# Patient Record
Sex: FEMALE | Race: BLACK OR AFRICAN AMERICAN
[De-identification: names, ages, dates, MRNs, and addresses within clinical notes are randomized per-mention and may not be internally consistent; named-entity substitution may affect disease eponyms.]

---

## 2017-03-20 ENCOUNTER — HOSPITAL ENCOUNTER (EMERGENCY)
Dept: HOSPITAL 62 - ER | Age: 37
Discharge: HOME | End: 2017-03-20
Payer: SELF-PAY

## 2017-03-20 VITALS — DIASTOLIC BLOOD PRESSURE: 104 MMHG | SYSTOLIC BLOOD PRESSURE: 162 MMHG

## 2017-03-20 DIAGNOSIS — I10: ICD-10-CM

## 2017-03-20 DIAGNOSIS — F17.200: ICD-10-CM

## 2017-03-20 DIAGNOSIS — R11.2: Primary | ICD-10-CM

## 2017-03-20 DIAGNOSIS — R19.7: ICD-10-CM

## 2017-03-20 LAB
ALBUMIN SERPL-MCNC: 4.6 G/DL (ref 3.5–5)
ALP SERPL-CCNC: 98 U/L (ref 38–126)
ALT SERPL-CCNC: 18 U/L (ref 9–52)
ANION GAP SERPL CALC-SCNC: 14 MMOL/L (ref 5–19)
APPEARANCE UR: (no result)
AST SERPL-CCNC: 21 U/L (ref 14–36)
BASOPHILS # BLD AUTO: 0 10^3/UL (ref 0–0.2)
BASOPHILS NFR BLD AUTO: 0.3 % (ref 0–2)
BILIRUB DIRECT SERPL-MCNC: 0.3 MG/DL (ref 0–0.4)
BILIRUB SERPL-MCNC: 0.9 MG/DL (ref 0.2–1.3)
BILIRUB UR QL STRIP: NEGATIVE
BUN SERPL-MCNC: 9 MG/DL (ref 7–20)
CALCIUM: 10.4 MG/DL (ref 8.4–10.2)
CHLORIDE SERPL-SCNC: 106 MMOL/L (ref 98–107)
CO2 SERPL-SCNC: 24 MMOL/L (ref 22–30)
CREAT SERPL-MCNC: 0.69 MG/DL (ref 0.52–1.25)
EOSINOPHIL # BLD AUTO: 0.1 10^3/UL (ref 0–0.6)
EOSINOPHIL NFR BLD AUTO: 1.1 % (ref 0–6)
ERYTHROCYTE [DISTWIDTH] IN BLOOD BY AUTOMATED COUNT: 14.2 % (ref 11.5–14)
GLUCOSE SERPL-MCNC: 149 MG/DL (ref 75–110)
GLUCOSE UR STRIP-MCNC: NEGATIVE MG/DL
HCT VFR BLD CALC: 38.3 % (ref 36–47)
HGB BLD-MCNC: 13.1 G/DL (ref 12–15.5)
HGB HCT DIFFERENCE: 1
KETONES UR STRIP-MCNC: NEGATIVE MG/DL
LYMPHOCYTES # BLD AUTO: 2 10^3/UL (ref 0.5–4.7)
LYMPHOCYTES NFR BLD AUTO: 26.1 % (ref 13–45)
MCH RBC QN AUTO: 29.7 PG (ref 27–33.4)
MCHC RBC AUTO-ENTMCNC: 34.3 G/DL (ref 32–36)
MCV RBC AUTO: 87 FL (ref 80–97)
MONOCYTES # BLD AUTO: 0.4 10^3/UL (ref 0.1–1.4)
MONOCYTES NFR BLD AUTO: 5.6 % (ref 3–13)
NEUTROPHILS # BLD AUTO: 5.2 10^3/UL (ref 1.7–8.2)
NEUTS SEG NFR BLD AUTO: 66.9 % (ref 42–78)
NITRITE UR QL STRIP: NEGATIVE
PH UR STRIP: 5 [PH] (ref 5–9)
POTASSIUM SERPL-SCNC: 3.6 MMOL/L (ref 3.6–5)
PROT SERPL-MCNC: 8 G/DL (ref 6.3–8.2)
PROT UR STRIP-MCNC: NEGATIVE MG/DL
RBC # BLD AUTO: 4.42 10^6/UL (ref 3.72–5.28)
SODIUM SERPL-SCNC: 143.9 MMOL/L (ref 137–145)
SP GR UR STRIP: 1.02
UROBILINOGEN UR-MCNC: NEGATIVE MG/DL (ref ?–2)
WBC # BLD AUTO: 7.8 10^3/UL (ref 4–10.5)

## 2017-03-20 PROCEDURE — 81001 URINALYSIS AUTO W/SCOPE: CPT

## 2017-03-20 PROCEDURE — 84703 CHORIONIC GONADOTROPIN ASSAY: CPT

## 2017-03-20 PROCEDURE — 99284 EMERGENCY DEPT VISIT MOD MDM: CPT

## 2017-03-20 PROCEDURE — 36415 COLL VENOUS BLD VENIPUNCTURE: CPT

## 2017-03-20 PROCEDURE — 85025 COMPLETE CBC W/AUTO DIFF WBC: CPT

## 2017-03-20 PROCEDURE — 80053 COMPREHEN METABOLIC PANEL: CPT

## 2017-03-20 NOTE — ER DOCUMENT REPORT
ED Medical Screen (RME)





- General


Stated Complaint: DIARRHEA,VOMITING


Time seen by provider: 14:43


Mode of Arrival: Ambulatory


Information source: Patient


Notes: 


36-year-old female presents to ED for diarrhea and vomiting with chills she 

denies fevers.  Take this been going on for 2 days.  She states that she has 

lower abdominal pain bilaterally for 2 days.  She works at the drive-through at 

[employer] so she doesn't know who she was exposed to.  Last menstrual period 

was 01/01/2017.  She states she is normally regular but she's taken several 

pregnancy tests that were negative























I have greeted and performed a rapid initial assessment of this patient.  A 

comprehensive ED assessment and evaluation of the patient, analysis of test 

results and completion of medical decision making process will be conducted by 

an additional ED providers.


TRAVEL OUTSIDE OF THE [place] IN LAST 30 DAYS: No





- Related Data


Allergies/Adverse Reactions: 


 





No Known Allergies Allergy (Verified 06/15/14 06:59)


 











Past Medical History





- Past Medical History


Cardiac Medical History: Reports: Hx Hypertension





- Immunizations


Hx Diphtheria, Pertussis, Tetanus Vaccination: Yes





Physical Exam





- Vital signs


Vitals: 





 











Temp Pulse Resp BP Pulse Ox


 


 98.2 F   91   16   141/91 H  98 


 


 03/20/17 14:41  03/20/17 14:41  03/20/17 14:41  03/20/17 14:41  03/20/17 14:41














Course





- Vital Signs


Vital signs: 





 











Temp Pulse Resp BP Pulse Ox


 


 98.2 F   91   16   141/91 H  98 


 


 03/20/17 14:41  03/20/17 14:41  03/20/17 14:41  03/20/17 14:41  03/20/17 14:41

## 2017-03-20 NOTE — ER DOCUMENT REPORT
ED GI/





- General


Chief Complaint: Nausea/Vomiting/Diarrhea


Stated Complaint: DIARRHEA,VOMITING


Mode of Arrival: Ambulatory


Notes: 


Patient is a 36-year-old female that comes emergency department with chief 

complaint of nausea, vomiting, and a couple of loose stools.  Patient states 

she has vomited 4 times today, she started feeling ill yesterday.  Reports 

multiple sick contacts at work, states she thinks she got this and she needs a 

work.  She denies any abdominal pain, flank pain, vaginal bleeding or 

discharge.  She is unsure fever.  She denies any recent antibiotics.  She 

states she has missed a menstrual period for about 6 weeks, takes normal 

contraceptive.  She has not had a menstrual period since she started a new job.

  Denies any abdominal surgeries or daily medications.


TRAVEL OUTSIDE OF THE [place] IN LAST 30 DAYS: No





- Related Data


Allergies/Adverse Reactions: 


 





No Known Allergies Allergy (Verified 03/20/17 14:45)


 











Past Medical History





- General


Information source: Patient





- Social History


Smoking Status: Current Every Day Smoker


Chew tobacco use (# tins/day): No


Frequency of alcohol use: Occasional


Drug Abuse: None


Lives with: Family


Family History: Reviewed & Not Pertinent


Patient has suicidal ideation: No


Patient has homicidal ideation: No





- Past Medical History


Cardiac Medical History: Reports: Hx Hypertension


Renal/ Medical History: Denies: Hx Peritoneal Dialysis


Surgical Hx: Negative





- Immunizations


Hx Diphtheria, Pertussis, Tetanus Vaccination: Yes





Review of Systems





- Review of Systems


Constitutional: No symptoms reported


EENT: No symptoms reported


Cardiovascular: No symptoms reported


Respiratory: No symptoms reported


Gastrointestinal: See HPI


Genitourinary: No symptoms reported


Female Genitourinary: No symptoms reported


Musculoskeletal: No symptoms reported


Skin: No symptoms reported


Hematologic/Lymphatic: No symptoms reported


Neurological/Psychological: No symptoms reported





Physical Exam





- Vital signs


Vitals: 


 











Temp Pulse Resp BP Pulse Ox


 


 98.2 F   91   16   141/91 H  98 


 


 03/20/17 14:41  03/20/17 14:41  03/20/17 14:41  03/20/17 14:41  03/20/17 14:41











Interpretation: Normal





- General


General appearance: Appears well, Alert


In distress: None - Smiling and well-appearing





- HEENT


Head: Normocephalic, Atraumatic


Eyes: Normal


Pupils: PERRL





- Respiratory


Respiratory status: No respiratory distress


Chest status: Nontender


Breath sounds: Normal.  No: Decreased air movement, Wheezing


Chest palpation: Normal





- Cardiovascular


Rhythm: Regular.  No: Tachycardia


Heart sounds: Normal auscultation, S1 appreciated, S2 appreciated


Murmur: No





- Abdominal


Inspection: Normal


Distension: No distension


Bowel sounds: Normal


Tenderness: Nontender.  No: Tender - Soft and benign


Organomegaly: No organomegaly





- Back


Back: Normal, Nontender





- Extremities


General upper extremity: Normal inspection, Nontender, Normal color, Normal ROM

, Normal temperature


General lower extremity: Normal inspection, Nontender, Normal color, Normal ROM

, Normal temperature, Normal weight bearing.  No: Dustin's sign





- Neurological


Neuro grossly intact: Yes


Cognition: Normal


Orientation: AAOx4


[place] Coma Scale Eye Opening: Spontaneous


[place] Coma Scale Verbal: Oriented


Ivy Coma Scale Motor: Obeys Commands


[place]vy Coma Scale Total: 15


Speech: Normal


Motor strength normal: LUE, RUE, LLE, RLE


Sensory: Normal





- Psychological


Associated symptoms: Normal affect, Normal mood





- Skin


Skin Temperature: Warm


Skin Moisture: Dry


Skin Color: Normal





Course





- Re-evaluation


Re-evalutation: 


Laboratory workup generally unremarkable with no leukocytosis, generally 

unremarkable urine and chemistry.  No tachycardia, fever, hypotension.  Soft 

abdomen on my exam.  Patient smiling and well-appearing.  Patient states she 

feels great after the Phenergan.  Will prescribe this at home.  Patient is 

slightly hypertensive, she states that normally at home hers is normal, states 

she will follow this with primary care.  She denies any headache or chest pain.

  She denies any current symptoms.  Suspect patient has a viral syndrome, 

discussed primary care follow-up and return precautions.  Patient states 

understanding and agreement.





- Vital Signs


Vital signs: 


 











Temp Pulse Resp BP Pulse Ox


 


 98.3 F   87   16   162/104 H  100 


 


 03/20/17 21:47  03/20/17 21:47  03/20/17 21:47  03/20/17 21:47  03/20/17 21:47














- Laboratory


Result Diagrams: 


 03/20/17 15:02





 03/20/17 15:20


Laboratory results interpreted by me: 


 











  03/20/17 03/20/17





  15:02 15:20


 


RDW  14.2 H 


 


Glucose   149 H


 


Calcium   10.4 H














Discharge





- Discharge


Clinical Impression: 


 Nausea vomiting and diarrhea





Condition: Stable


Disposition: HOME, SELF-CARE


Additional Instructions: 


Pregnancy test is negative.


Workup, examination, and symptoms are most consistent with a viral syndrome, 

this should resolve on its own.


I recommend Tylenol for fever/chills.


Rest, hydrate, take the Phenergan if needed for nausea, start with bland foods 

to begin with.


Return the emergency department for any concerning worsening symptoms including 

uncontrollable vomiting, severe abdominal pain, etc.


Prescriptions: 


Promethazine HCl [Phenergan 25 mg Tablet] 1 - 2 tab PO Q6H PRN #20 tablet


 PRN Reason: 


Forms:  Return to Work, Elevated Blood Pressure

## 2017-05-09 ENCOUNTER — HOSPITAL ENCOUNTER (EMERGENCY)
Dept: HOSPITAL 62 - ER | Age: 37
Discharge: HOME | End: 2017-05-09
Payer: SELF-PAY

## 2017-05-09 VITALS — DIASTOLIC BLOOD PRESSURE: 86 MMHG | SYSTOLIC BLOOD PRESSURE: 137 MMHG

## 2017-05-09 DIAGNOSIS — R05: ICD-10-CM

## 2017-05-09 DIAGNOSIS — J00: Primary | ICD-10-CM

## 2017-05-09 DIAGNOSIS — R51: ICD-10-CM

## 2017-05-09 DIAGNOSIS — J34.89: ICD-10-CM

## 2017-05-09 DIAGNOSIS — I10: ICD-10-CM

## 2017-05-09 PROCEDURE — 71020: CPT

## 2017-05-09 PROCEDURE — 99283 EMERGENCY DEPT VISIT LOW MDM: CPT

## 2017-05-09 PROCEDURE — 87880 STREP A ASSAY W/OPTIC: CPT

## 2017-05-09 PROCEDURE — 87070 CULTURE OTHR SPECIMN AEROBIC: CPT

## 2017-05-09 PROCEDURE — 87804 INFLUENZA ASSAY W/OPTIC: CPT

## 2017-05-09 NOTE — ER DOCUMENT REPORT
ED Respiratory Problem





- General


Chief Complaint: Cold Symptoms


Stated Complaint: HEADACHE,FEVER


Time Seen by Provider: 05/09/17 08:16


Mode of Arrival: Ambulatory


Information source: Patient


Notes: 


Patient is a 36-year-old American female who presents to the ER today for cough

, congestion, runny nose, sore throat and pain in her chest with breathing that 

all began yesterday.  She admits to fever, chills and body aches but has not 

taken her temperature.  She tried some [place] over-the-counter cold 

medication that did not help.  She denies any history of asthma.


TRAVEL OUTSIDE OF THE [place] IN LAST 30 DAYS: No





- Related Data


Allergies/Adverse Reactions: 


 





No Known Allergies Allergy (Verified 05/09/17 08:00)


 











Past Medical History





- General


Information source: Patient





- Social History


Smoking Status: Unknown if Ever Smoked


Family History: Reviewed & Not Pertinent


Patient has suicidal ideation: No


Patient has homicidal ideation: No





- Past Medical History


Cardiac Medical History: Reports: Hx Hypertension


Renal/ Medical History: Denies: Hx Peritoneal Dialysis





- Immunizations


Hx Diphtheria, Pertussis, Tetanus Vaccination: Yes





Review of Systems





- Review of Systems


Constitutional: See HPI


EENT: See HPI


Cardiovascular: No symptoms reported


Respiratory: See HPI


Gastrointestinal: No symptoms reported


Genitourinary: No symptoms reported


Female Genitourinary: No symptoms reported


Musculoskeletal: No symptoms reported


Skin: No symptoms reported


Hematologic/Lymphatic: No symptoms reported


Neurological/Psychological: No symptoms reported





Physical Exam





- Vital signs


Vitals: 


 











Temp Pulse Resp BP Pulse Ox


 


 99.2 F   105 H  20   127/88 H  96 


 


 05/09/17 08:00  05/09/17 08:00  05/09/17 08:00  05/09/17 08:00  05/09/17 08:00














- Notes


Notes: 


PHYSICAL EXAMINATION: 


GENERAL: Mildly ill-appearing, but in no acute distress. 


HEAD: Atraumatic, normocephalic. 


EYES: Pupils equal round and reactive to light, extraocular movements intact, 

sclera anicteric, conjunctiva are normal. 


ENT: ear canals without erythema or foreign body, TMs pearly grey with good 

bony landmarks, nares with mucoid discharge, oropharynx erythematous with 

enlarged tonsils without exudates. Moist mucous membranes. 


NECK: Normal range of motion, supple without lymphadenopathy 


LUNGS: Chest nontender to palpation, Coughing, otherwise CTAB and equal. No 

wheezes rales or rhonchi. 


HEART: Regular rate and rhythm without murmurs


EXTREMITIES: Normal range of motion, no pitting edema. No cyanosis. 


NEUROLOGICAL: Cranial nerves grossly intact. Normal sensory/motor exams. 


PSYCH: Normal mood, normal affect. 


SKIN: Warm, Dry, normal turgor, no rashes or lesions noted 

















Course





- Re-evaluation


Re-evalutation: 





05/09/17 09:08


Flu and strep are negative.  Chest x-ray reveals no acute abnormality.  Patient 

will be placed on conservative, symptomatic treatment.





- Vital Signs


Vital signs: 


 











Temp Pulse Resp BP Pulse Ox


 


 99.2 F   105 H  20   127/88 H  96 


 


 05/09/17 08:00  05/09/17 08:00  05/09/17 08:00  05/09/17 08:00  05/09/17 08:00














Discharge





- Discharge


Clinical Impression: 


Upper respiratory infection


Qualifiers:


 URI type: acute nasopharyngitis (common cold) Qualified Code(s): J00 - Acute 

nasopharyngitis [common cold]





Instructions:  Upper Respiratory Illness ([facility]), Fever ([facility])


Additional Instructions: 


Return immediately for any new or worsening symptoms.





Follow up with primary care provider, call tomorrow to make followup 

appointment.


Prescriptions: 


Hydrocodone Bit/Homatropine [Hycodan Syrup 5-1.5 mg/5 ml Ud Cup] 5 ml PO Q4HP 

PRN #120 ml


 PRN Reason: 


Fluticasone Propionate [Flonase Allergy Relief] 15.8 ml NS BID #1 spray.susp


Ibuprofen [Motrin 800 mg Tablet] 800 mg PO Q8H PRN #30 tab


 PRN Reason: 


Forms:  Return to Work

## 2017-08-07 ENCOUNTER — HOSPITAL ENCOUNTER (EMERGENCY)
Dept: HOSPITAL 62 - ER | Age: 37
Discharge: HOME | End: 2017-08-07
Payer: SELF-PAY

## 2017-08-07 VITALS — DIASTOLIC BLOOD PRESSURE: 93 MMHG | SYSTOLIC BLOOD PRESSURE: 163 MMHG

## 2017-08-07 DIAGNOSIS — R68.84: ICD-10-CM

## 2017-08-07 DIAGNOSIS — F17.200: ICD-10-CM

## 2017-08-07 DIAGNOSIS — R22.0: ICD-10-CM

## 2017-08-07 DIAGNOSIS — K08.89: Primary | ICD-10-CM

## 2017-08-07 PROCEDURE — 99283 EMERGENCY DEPT VISIT LOW MDM: CPT

## 2017-08-07 PROCEDURE — S0119 ONDANSETRON 4 MG: HCPCS

## 2017-08-07 NOTE — ER DOCUMENT REPORT
ED ENT





- General


Chief Complaint: Facial Swelling


Stated Complaint: FACIAL SWELLING/POSSIBLE INFECTED TOOTH


Time Seen by Provider: 08/07/17 07:12


Notes: 


Patient is a 36-year-old female who presents emergency department complaining 

of right upper jaw pain with associated facial swelling.  Patient states that 

she fractured her tooth a couple months ago without any problems with.  Patient 

states that it started bothering her yesterday at work and she is taking over-

the-counter Tylenol with moderate improvement in her symptoms.  She states that 

her last dose was about 11 PM last evening.  Patient states that she woke up 

this morning with swelling on her face and tenderness of her cheek.  Otherwise 

denies any fever, chills, difficulty swallowing, difficulty breathing.  Patient 

otherwise healthy


TRAVEL OUTSIDE OF THE [place] IN LAST 30 DAYS: No





- Related Data


Allergies/Adverse Reactions: 


 





No Known Allergies Allergy (Verified 08/07/17 06:54)


 











Past Medical History





- Social History


Smoking Status: Current Every Day Smoker


Chew tobacco use (# tins/day): No


Frequency of alcohol use: Occasional


Drug Abuse: None


Family History: Reviewed & Not Pertinent





- Past Medical History


Cardiac Medical History: Reports: Hx Hypertension


Renal/ Medical History: Denies: Hx Peritoneal Dialysis


Surgical Hx: Negative





- Immunizations


Hx Diphtheria, Pertussis, Tetanus Vaccination: Yes





Review of Systems





- Review of Systems


Constitutional: No symptoms reported


EENT: See HPI


-: Yes All other systems reviewed and negative





Physical Exam





- Vital signs


Vitals: 


 











Temp Pulse Resp BP Pulse Ox


 


 98.5 F   88   20   164/99 H  98 


 


 08/07/17 06:42  08/07/17 06:42  08/07/17 06:42  08/07/17 06:42  08/07/17 06:42














- HEENT


Head: Normocephalic, Atraumatic


Eyes: Normal.  No: Periorbital edema


Conjunctiva: Normal


Extraocular movements intact: Yes


Eyelashes: Normal


Pupils: PERRL


Mouth/Lips: Dental fracture - on 2.  No: Laceration


Mucous membranes: Normal


Pharynx: Normal.  No: Blood in hypopharynx, Peritonsillar abscess, 

Retropharyngeal abscess, Potential airway comprom.


Neck: Normal.  No: Lymphadenopathy





- Respiratory


Respiratory status: No respiratory distress


Chest status: Nontender


Breath sounds: Normal


Chest palpation: Normal





- Cardiovascular


Rhythm: Regular


Heart sounds: Normal auscultation, S1 appreciated, S2 appreciated


Murmur: No


Gallop: None auscultated





- Neurological


Neuro grossly intact: Yes


Cognition: Normal


Orientation: AAOx4


Ivy Coma Scale Eye Opening: Spontaneous


[place] Coma Scale Verbal: Oriented


[place] Coma Scale Motor: Obeys Commands


[place] Coma Scale Total: 15


Speech: Normal


Cranial nerves: Normal


Cerebellar coordination: Normal


Motor strength normal: LUE, RUE, LLE, RLE


Additional motor exam normals: Equal , Weakness


Sensory: Normal





Course





- Re-evaluation


Re-evalutation: 


08/07/17 08:31


Patient is a 36-year-old female who is hemodynamically stable, no acute 

distress and afebrile.  Presentation is most consistent with likely an infected 

tooth.  Airway is patent.  Vitals within normal limits.  Patient is able 

swallow without any difficulty.  There is no significant facial swelling.  

Patient will be started on antibiotics and a limited number of pain 

medications.  I've instructed to follow-up with dentistry as earliest ability 

for definitive management.  Return precautions and follow-up recommendations 

have been discussed at length.











- Vital Signs


Vital signs: 


 











Temp Pulse Resp BP Pulse Ox


 


 98.5 F   88   20   164/99 H  98 


 


 08/07/17 06:42  08/07/17 06:42  08/07/17 06:42  08/07/17 06:42  08/07/17 06:42














Discharge





- Discharge


Clinical Impression: 


 Toothache





Condition: Good


Disposition: HOME, SELF-CARE


Additional Instructions: 


TOOTHACHE:





     Your pain is due to dental decay.  The tooth must be repaired in order for 

you to feel better.  You will, therefore, be referred to a dentist.  We do not 

have dentists on the staff at [facility].


     Severe swelling or drainage around a tooth usually means a dental abscess.

  This also requires evaluation and treatment by the dentist, but antibiotics 

may be prescribed while awaiting dental treatment.


     You should be rechecked immediately if you develop major swelling of the 

face, increasing pain, a lump in the jaw or gums, headache, difficulty 

swallowing, or fever.








ORAL NARCOTIC MEDICATION:


     You have been given a prescription for pain control.  This medication is a 

narcotic.  It's best taken with food, as nausea can result if taken on an empty 

stomach.


     Don't operate machinery or drive within six hours of taking this 

medication.  Do not combine this medicine with alcohol, or with any medication 

which can cause sedation (such as cold tablets or sleeping pills) unless you 

get permission from the physician.


     Narcotics tend to cause constipation.  If possible, drink plenty of fluids 

and eat a diet high in fiber and fruits.





     Please be aware that prescription narcotics also have the potential for 

abuse.  People become addicted to these medications because of the general 

sense of wellbeing that they induce.  This feeling along with a significant 

reduction in tension, anxiety, and aggression provides a stimulating seductive 

quality to these drugs.  Once your pain is under control, we encourage you to 

discard your unused narcotics.








PENICILLIN V K:


     You have been given a prescription for Penicillin VK.  Your physician has 

determined that this is the best antibiotic for your condition.


     Pen VK can be taken with meals, however more of the antibiotic gets into 

the bloodstream if it's taken on an empty stomach.


     Penicillin usually has no side effects.  However, allergy to penicillins 

is common.  If you have had an allergic reaction to any drug of the penicillin 

family, you should never take any other penicillin.  Notify your doctor at once 

if you develop hives, itching, swelling, faintness, or shortness of breath.








FOLLOW-UP CARE:


     You have been referred for follow-up care to the dentists listed below.  

Call the dentists office for an appointment as you were instructed or within 

the next two days.  If you experience worsening or a significant change in your 

symptoms, notify the physician immediately or return to the Emergency 

Department at any time for re-evaluation.





[facility]


[address] [place], [place]


[phone]


Friday mornings, by appointment





[facility]


[address] [place] [address] [phone]





[facility]


[address].


[phone]





[facility]


[address]


[facility]


[phone]





[facility]


[address].


[phone]


[email or web address]





[facility]


[address] [place]  [address] [phone]


Monday-Thursdays  8:00am to 5:00 pm


Will see patients from other [facility].


Charges based on income and family size and


accepts Medicare, Medicaid, and Insurances


Will pull molars





[facility]


Student Clinics


[facility] [address] [phone]


Hours of Operation


   8:00 am - 4:30 pm weekdays





The following dental offices accept Medicaid:





   [facility]   [phone]


   Dr. Burnett         [phone]


   Dr. Martinez         [phone]


   Dr. Avelar         [phone]


   Dr. Stahl         [phone]


   [facility]


      oral surgery      [phone]


   Dr. Moran ([place])      [phone]


   Dr. Allison ([place])   [phone]


   [facility]      [phone]


   Monique Uriarte ([place])   [phone]


   Dr. Vogel ([place])      [phone]


   [facility]      [phone]


   [facility]   [phone]


   [facility]   [phone]


   Dr. Acharya ([place])      [phone]


   Monique Rubin and 


      ([place])      [phone]





   Medicaid Care Line      [phone]


Prescriptions: 


Ibuprofen [Motrin 600 Mg Tablet] 600 mg PO TID #15 tablet


Penicillin V Potassium [Penicillin Vk 500 mg Tablet] 500 mg PO BID #20 tablet

## 2017-11-17 ENCOUNTER — HOSPITAL ENCOUNTER (EMERGENCY)
Dept: HOSPITAL 62 - ER | Age: 37
Discharge: HOME | End: 2017-11-17
Payer: SELF-PAY

## 2017-11-17 VITALS — DIASTOLIC BLOOD PRESSURE: 89 MMHG | SYSTOLIC BLOOD PRESSURE: 138 MMHG

## 2017-11-17 DIAGNOSIS — R10.9: ICD-10-CM

## 2017-11-17 DIAGNOSIS — M54.5: ICD-10-CM

## 2017-11-17 DIAGNOSIS — R11.2: ICD-10-CM

## 2017-11-17 DIAGNOSIS — F17.200: ICD-10-CM

## 2017-11-17 DIAGNOSIS — R19.7: ICD-10-CM

## 2017-11-17 DIAGNOSIS — R03.0: ICD-10-CM

## 2017-11-17 DIAGNOSIS — E86.0: Primary | ICD-10-CM

## 2017-11-17 LAB
ALBUMIN SERPL-MCNC: 4.9 G/DL (ref 3.5–5)
ALP SERPL-CCNC: 122 U/L (ref 38–126)
ALT SERPL-CCNC: 26 U/L (ref 9–52)
ANION GAP SERPL CALC-SCNC: 16 MMOL/L (ref 5–19)
APPEARANCE UR: (no result)
AST SERPL-CCNC: 37 U/L (ref 14–36)
BASOPHILS # BLD AUTO: 0 10^3/UL (ref 0–0.2)
BASOPHILS NFR BLD AUTO: 0.3 % (ref 0–2)
BILIRUB DIRECT SERPL-MCNC: 0.6 MG/DL (ref 0–0.4)
BILIRUB SERPL-MCNC: 1.9 MG/DL (ref 0.2–1.3)
BILIRUB UR QL STRIP: NEGATIVE
BUN SERPL-MCNC: 10 MG/DL (ref 7–20)
CALCIUM: 10.1 MG/DL (ref 8.4–10.2)
CHLORIDE SERPL-SCNC: 103 MMOL/L (ref 98–107)
CO2 SERPL-SCNC: 24 MMOL/L (ref 22–30)
CREAT SERPL-MCNC: 0.62 MG/DL (ref 0.52–1.25)
EOSINOPHIL # BLD AUTO: 0.1 10^3/UL (ref 0–0.6)
EOSINOPHIL NFR BLD AUTO: 0.7 % (ref 0–6)
ERYTHROCYTE [DISTWIDTH] IN BLOOD BY AUTOMATED COUNT: 13.7 % (ref 11.5–14)
GLUCOSE SERPL-MCNC: 115 MG/DL (ref 75–110)
GLUCOSE UR STRIP-MCNC: NEGATIVE MG/DL
HCT VFR BLD CALC: 41 % (ref 36–47)
HGB BLD-MCNC: 14.5 G/DL (ref 12–15.5)
HGB HCT DIFFERENCE: 2.5
KETONES UR STRIP-MCNC: (no result) MG/DL
LIPASE SERPL-CCNC: 169.9 U/L (ref 23–300)
LYMPHOCYTES # BLD AUTO: 1.8 10^3/UL (ref 0.5–4.7)
LYMPHOCYTES NFR BLD AUTO: 23.9 % (ref 13–45)
MCH RBC QN AUTO: 31.8 PG (ref 27–33.4)
MCHC RBC AUTO-ENTMCNC: 35.3 G/DL (ref 32–36)
MCV RBC AUTO: 90 FL (ref 80–97)
MONOCYTES # BLD AUTO: 0.4 10^3/UL (ref 0.1–1.4)
MONOCYTES NFR BLD AUTO: 5.7 % (ref 3–13)
NEUTROPHILS # BLD AUTO: 5.3 10^3/UL (ref 1.7–8.2)
NEUTS SEG NFR BLD AUTO: 69.4 % (ref 42–78)
NITRITE UR QL STRIP: NEGATIVE
PH UR STRIP: 5 [PH] (ref 5–9)
POTASSIUM SERPL-SCNC: 4 MMOL/L (ref 3.6–5)
PROT SERPL-MCNC: 8.4 G/DL (ref 6.3–8.2)
PROT UR STRIP-MCNC: 100 MG/DL
RBC # BLD AUTO: 4.56 10^6/UL (ref 3.72–5.28)
SODIUM SERPL-SCNC: 142.8 MMOL/L (ref 137–145)
SP GR UR STRIP: 1.02
UROBILINOGEN UR-MCNC: NEGATIVE MG/DL (ref ?–2)
WBC # BLD AUTO: 7.6 10^3/UL (ref 4–10.5)

## 2017-11-17 PROCEDURE — 84703 CHORIONIC GONADOTROPIN ASSAY: CPT

## 2017-11-17 PROCEDURE — 84443 ASSAY THYROID STIM HORMONE: CPT

## 2017-11-17 PROCEDURE — 85025 COMPLETE CBC W/AUTO DIFF WBC: CPT

## 2017-11-17 PROCEDURE — 80053 COMPREHEN METABOLIC PANEL: CPT

## 2017-11-17 PROCEDURE — 81001 URINALYSIS AUTO W/SCOPE: CPT

## 2017-11-17 PROCEDURE — 36415 COLL VENOUS BLD VENIPUNCTURE: CPT

## 2017-11-17 PROCEDURE — 96360 HYDRATION IV INFUSION INIT: CPT

## 2017-11-17 PROCEDURE — 83690 ASSAY OF LIPASE: CPT

## 2017-11-17 PROCEDURE — S0119 ONDANSETRON 4 MG: HCPCS

## 2017-11-17 PROCEDURE — 99284 EMERGENCY DEPT VISIT MOD MDM: CPT

## 2017-11-17 PROCEDURE — 87086 URINE CULTURE/COLONY COUNT: CPT

## 2017-11-17 NOTE — ER DOCUMENT REPORT
HPI





- HPI


Pain Level: 5





- REPRODUCTIVE


Reproductive: DENIES: Pregnant:





Past Medical History





- Social History


Family History: Reviewed & Not Pertinent





- Past Medical History


Cardiac Medical History: Reports: Hx Hypertension


Renal/ Medical History: Denies: Hx Peritoneal Dialysis





- Immunizations


Hx Diphtheria, Pertussis, Tetanus Vaccination: Yes





[facility] Provider Document





- INFECTION CONTROL


TRAVEL OUTSIDE OF THE [place] IN LAST 30 DAYS: No





- RESPIRATORY


O2 Sat by Pulse Oximetry: 98





Course





- Vital Signs


Vital signs: 


 











Temp Pulse Resp BP Pulse Ox


 


 98.7 F   111 H  18   143/101 H  98 


 


 11/17/17 08:25  11/17/17 08:25  11/17/17 08:25  11/17/17 08:25  11/17/17 08:25

## 2017-11-17 NOTE — ER DOCUMENT REPORT
ED GI/





- General


Chief Complaint: Nausea/Vomiting


Stated Complaint: VOMITING


Time Seen by Provider: 17 08:47


Mode of Arrival: Ambulatory


Information source: Patient


Notes: 





35 yo normally healthy, smoker, 2 beers daily until monday, non drugs, no abd 

surgery, . Got off work monday,  woke up with abd. cramps, low bcak pain, 

nausea, vomimting, and warery diarrhea (no blood). No recent antibioitics, no 

travel outside [place], works at [employer], No hx crohns, colitis, diverticulitis. No 

menses since Feb. (mom menopause at age 33) Fever Monday night to tuesday. Hx 

small gallstones.


TRAVEL OUTSIDE OF THE [place] IN LAST 30 DAYS: No





- Related Data


Allergies/Adverse Reactions: 


 





No Known Allergies Allergy (Verified 17 08:30)


 











Past Medical History





- General


Information source: Patient





- Social History


Smoking Status: Current Every Day Smoker


Frequency of alcohol use: 2 beers daily until monday


Drug Abuse: None


Occupation: [employer]


Lives with: Spouse/Significant other


Family History: Reviewed & Not Pertinent





- Past Medical History


Cardiac Medical History: Reports: Hx Hypertension


Renal/ Medical History: Denies: Hx Peritoneal Dialysis


Surgical Hx: Negative





- Immunizations


Hx Diphtheria, Pertussis, Tetanus Vaccination: Yes





Review of Systems





- Review of Systems


Constitutional: See HPI


EENT: No symptoms reported


Cardiovascular: No symptoms reported


Respiratory: No symptoms reported


Gastrointestinal: See HPI


Genitourinary: No symptoms reported


Female Genitourinary: No symptoms reported


Musculoskeletal: No symptoms reported


Skin: No symptoms reported


Hematologic/Lymphatic: No symptoms reported


Neurological/Psychological: No symptoms reported





Physical Exam





- Vital signs


Vitals: 


 











Temp Pulse Resp BP Pulse Ox


 


 98.7 F   111 H  18   143/101 H  98 


 


 17 08:25  17 08:25  17 08:25  17 08:25  17 08:25











Interpretation: Normal





- General


General appearance: Appears well, Alert





- HEENT


Head: Normocephalic, Atraumatic


Eyes: Normal


Conjunctiva: Normal


Pupils: PERRL


Neck: Supple.  No: Lymphadenopathy





- Respiratory


Respiratory status: No respiratory distress


Chest status: Nontender


Breath sounds: Normal


Chest palpation: Normal





- Cardiovascular


Rhythm: Regular


Heart sounds: Normal auscultation


Murmur: No





- Abdominal


Inspection: Normal


Distension: No distension


Bowel sounds: Normal


Tenderness: Tender - mild epigastric, LUQ


Organomegaly: No organomegaly





- Back


Back: Normal, Nontender.  No: CVA tenderness





- Extremities


General upper extremity: Normal inspection, Nontender, Normal color, Normal ROM

, Normal temperature


General lower extremity: Normal inspection, Nontender, Normal color, Normal ROM

, Normal temperature, Normal weight bearing.  No: Dustin's sign





- Neurological


Neuro grossly intact: Yes


Cognition: Normal


Orientation: AAOx4


Rudolph Coma Scale Eye Opening: Spontaneous


Ivy Coma Scale Verbal: Oriented


Ivy Coma Scale Motor: Obeys Commands


Ivy Coma Scale Total: 15


Speech: Normal


Motor strength normal: LUE, RUE, LLE, RLE


Sensory: Normal





- Psychological


Associated symptoms: Normal affect, Normal mood





- Skin


Skin Temperature: Warm


Skin Moisture: Dry


Skin Color: Normal


Skin irregularity: negative: Rash





Course





- Re-evaluation


Re-evalutation: 





17 11:01


feels better after 1 liter NS, feels hungary. Needs work note.  Non tender 

abdomen. Labs normal except for bilirubin mild elevation.


17 11:13





17 11:13








- Vital Signs


Vital signs: 


 











Temp Pulse Resp BP Pulse Ox


 


 98.7 F   88   18   138/89 H  98 


 


 17 11:04  17 11:04  17 08:25  17 11:04  17 11:04














- Laboratory


Result Diagrams: 


 17 09:40





 17 09:40


Laboratory results interpreted by me: 


 











  17





  08:40 09:40


 


Glucose   115 H


 


Total Bilirubin   1.9 H


 


Direct Bilirubin   0.6 H


 


AST   37 H


 


Total Protein   8.4 H


 


Urine Protein  100 H 


 


Urine Ketones  TRACE H 


 


Urine Blood  SMALL H 














Discharge





- Discharge


Clinical Impression: 


 elevated blood pressure, Vomiting and diarrhea, Dehydration





Condition: Good


Disposition: HOME, SELF-CARE


Instructions:  Diarrhea, Nonspecific ([facility]), Family Physicians / Practices, 

Nausea or Vomiting, Nonspecific ([facility]), Stop Smoking ([facility])


Additional Instructions: 


continue to hydrate today


call me in am if need to be out of work tomorrow too. [phone]


to er if worse





Forms:  Return to Work

## 2019-08-14 ENCOUNTER — HOSPITAL ENCOUNTER (EMERGENCY)
Dept: HOSPITAL 62 - ER | Age: 39
Discharge: HOME | End: 2019-08-14
Payer: SELF-PAY

## 2019-08-14 VITALS — DIASTOLIC BLOOD PRESSURE: 92 MMHG | SYSTOLIC BLOOD PRESSURE: 151 MMHG

## 2019-08-14 DIAGNOSIS — R11.2: ICD-10-CM

## 2019-08-14 DIAGNOSIS — M79.10: ICD-10-CM

## 2019-08-14 DIAGNOSIS — K80.20: Primary | ICD-10-CM

## 2019-08-14 DIAGNOSIS — F17.210: ICD-10-CM

## 2019-08-14 DIAGNOSIS — Z88.2: ICD-10-CM

## 2019-08-14 DIAGNOSIS — R19.7: ICD-10-CM

## 2019-08-14 LAB
ADD MANUAL DIFF: NO
ALBUMIN SERPL-MCNC: 4.6 G/DL (ref 3.5–5)
ALP SERPL-CCNC: 113 U/L (ref 38–126)
ANION GAP SERPL CALC-SCNC: 10 MMOL/L (ref 5–19)
APPEARANCE UR: (no result)
APTT PPP: YELLOW S
AST SERPL-CCNC: 36 U/L (ref 14–36)
BASOPHILS # BLD AUTO: 0 10^3/UL (ref 0–0.2)
BASOPHILS NFR BLD AUTO: 0.3 % (ref 0–2)
BILIRUB DIRECT SERPL-MCNC: 0.4 MG/DL (ref 0–0.4)
BILIRUB SERPL-MCNC: 1.2 MG/DL (ref 0.2–1.3)
BILIRUB UR QL STRIP: NEGATIVE
BUN SERPL-MCNC: 6 MG/DL (ref 7–20)
CALCIUM: 10.2 MG/DL (ref 8.4–10.2)
CHLORIDE SERPL-SCNC: 108 MMOL/L (ref 98–107)
CO2 SERPL-SCNC: 23 MMOL/L (ref 22–30)
EOSINOPHIL # BLD AUTO: 0.1 10^3/UL (ref 0–0.6)
EOSINOPHIL NFR BLD AUTO: 0.9 % (ref 0–6)
ERYTHROCYTE [DISTWIDTH] IN BLOOD BY AUTOMATED COUNT: 14.7 % (ref 11.5–14)
GLUCOSE SERPL-MCNC: 100 MG/DL (ref 75–110)
GLUCOSE UR STRIP-MCNC: NEGATIVE MG/DL
HCT VFR BLD CALC: 33.3 % (ref 36–47)
HGB BLD-MCNC: 11.5 G/DL (ref 12–15.5)
KETONES UR STRIP-MCNC: (no result) MG/DL
LYMPHOCYTES # BLD AUTO: 1.3 10^3/UL (ref 0.5–4.7)
LYMPHOCYTES NFR BLD AUTO: 13.4 % (ref 13–45)
MCH RBC QN AUTO: 33.6 PG (ref 27–33.4)
MCHC RBC AUTO-ENTMCNC: 34.4 G/DL (ref 32–36)
MCV RBC AUTO: 98 FL (ref 80–97)
MONOCYTES # BLD AUTO: 0.5 10^3/UL (ref 0.1–1.4)
MONOCYTES NFR BLD AUTO: 4.7 % (ref 3–13)
NEUTROPHILS # BLD AUTO: 8.1 10^3/UL (ref 1.7–8.2)
NEUTS SEG NFR BLD AUTO: 80.7 % (ref 42–78)
NITRITE UR QL STRIP: NEGATIVE
PH UR STRIP: 5 [PH] (ref 5–9)
PLATELET # BLD: 343 10^3/UL (ref 150–450)
POTASSIUM SERPL-SCNC: 4.2 MMOL/L (ref 3.6–5)
PROT SERPL-MCNC: 7.9 G/DL (ref 6.3–8.2)
PROT UR STRIP-MCNC: 30 MG/DL
RBC # BLD AUTO: 3.41 10^6/UL (ref 3.72–5.28)
SP GR UR STRIP: 1.01
TOTAL CELLS COUNTED % (AUTO): 100 %
UROBILINOGEN UR-MCNC: NEGATIVE MG/DL (ref ?–2)
WBC # BLD AUTO: 10 10^3/UL (ref 4–10.5)

## 2019-08-14 PROCEDURE — 96375 TX/PRO/DX INJ NEW DRUG ADDON: CPT

## 2019-08-14 PROCEDURE — 99406 BEHAV CHNG SMOKING 3-10 MIN: CPT

## 2019-08-14 PROCEDURE — 81001 URINALYSIS AUTO W/SCOPE: CPT

## 2019-08-14 PROCEDURE — 76705 ECHO EXAM OF ABDOMEN: CPT

## 2019-08-14 PROCEDURE — 83690 ASSAY OF LIPASE: CPT

## 2019-08-14 PROCEDURE — 36415 COLL VENOUS BLD VENIPUNCTURE: CPT

## 2019-08-14 PROCEDURE — 80053 COMPREHEN METABOLIC PANEL: CPT

## 2019-08-14 PROCEDURE — 96361 HYDRATE IV INFUSION ADD-ON: CPT

## 2019-08-14 PROCEDURE — 99283 EMERGENCY DEPT VISIT LOW MDM: CPT

## 2019-08-14 PROCEDURE — 96374 THER/PROPH/DIAG INJ IV PUSH: CPT

## 2019-08-14 PROCEDURE — S0028 INJECTION, FAMOTIDINE, 20 MG: HCPCS

## 2019-08-14 PROCEDURE — 85025 COMPLETE CBC W/AUTO DIFF WBC: CPT

## 2019-08-14 PROCEDURE — 84703 CHORIONIC GONADOTROPIN ASSAY: CPT

## 2019-08-14 NOTE — ER DOCUMENT REPORT
ED General





- General


Chief Complaint: Abdominal Pain


Stated Complaint: SWOLLEN MOUTH


Time Seen by Provider: 08/14/19 09:25


Mode of Arrival: Ambulatory


Information source: Patient, [facility] Records


Notes: 





38-year-old female with hypertension presents to the emergency department with a

chief complaint of nausea, vomiting and diarrhea.  Patient states that yesterday

afternoon she had to leave work around 1:30 PM as she developed vomiting.  

Patient states that over the past 24 hours she has vomited over 10 times.  

Patient denies sick contacts.  Patient states she has had a few episodes of 

diarrhea that she reports is tacky brown in color.  Patient denies dark or 

bloody stools.  Patient denies fever or urinary symptoms.  Patient reports a 

pressure to her lower back.  Patient does complain of generalized abdominal pain

but specifically points to the epigastric and lower abdominal area.  Patient 

states that this feels like a throbbing ache.  Patient states she has not 

tolerated anything by mouth.  Patient states she does have a history of 

hypertension but does not take medications as she does not have a primary care 

physician.  Patient reports that she woke up this morning with numbness on the 

inside of the right lower mouth.  Patient states she did have a tooth pulled in 

the right upper mouth 1 month ago.  She feels like her mouth is slightly 

swollen.  Patient did receive IV fluids, Zofran, Pepcid prior to my exam and 

does report improvement of her epigastric abdominal pain and nausea.


TRAVEL OUTSIDE OF THE [place] IN LAST 30 DAYS: No





- HPI


Onset: Yesterday


Onset/Duration: Gradual, Persistent, Better


Quality of pain: Cramping


Severity: Mild


Associated symptoms: Body/muscle aches, Diarrhea, Earache, Headache, Nausea, 

Vomiting.  denies: Chest pain, Fever, Shortness of breath


Exacerbated by: Denies


Relieved by: Denies


Similar symptoms previously: No


Recently seen / treated by doctor: No





- Related Data


Allergies/Adverse Reactions: 


                                        





Sulfa (Sulfonamide Antibiotics) Adverse Reaction (Verified 08/14/19 10:00)


   











Past Medical History





- General


Information source: Patient, [facility] Records





- Social History


Smoking Status: Current Every Day Smoker


Cigarette use (# per day): Yes - 15


Smoking Education Provided: Yes - Smoking cessation counseling was provided for 

4 minutes at the bedside 


Frequency of alcohol use: Heavy


Drug Abuse: None


Lives with: Family


Family History: Reviewed & Not Pertinent


Patient has suicidal ideation: No


Patient has homicidal ideation: No





- Past Medical History


Cardiac Medical History: Reports: Hx Hypertension


Renal/ Medical History: Denies: Hx Peritoneal Dialysis





- Immunizations


Hx Diphtheria, Pertussis, Tetanus Vaccination: Yes





Review of Systems





- Review of Systems


Notes: 





REVIEW OF SYSTEMS:


CONSTITUTIONAL :  Denies fever,  chills, or sweats.  Denies recent illness. 

Denies weight loss, recent hospitalizations. 


EENT: Denies visual changes, eye pain.  Denies sore throat, oral lesions, 

difficulty swallowing.


CARDIOVASCULAR:  Denies chest pain.  Denies palpitations. Denies lower extremity

edema.


RESPIRATORY:  Denies cough. Denies shortness of breath, wheezing.


GASTROINTESTINAL:  Denies abdominal distention.  + nausea, vomiting,  diarrhea. 

Denies blood in vomitus, stools, or per rectum.  Denies black, tarry stools.  

Denies constipation.  


GENITOURINARY:  Denies difficulty urinating, painful urination,  frequency, 

blood in urine, or  vaginal discharge.


MUSCULOSKELETAL:  Denies back or neck pain or stiffness.  Denies joint pain or 

swelling.


SKIN:   Denies rash, lesions or sores.


HEMATOLOGIC :   Denies easy bruising or bleeding.


LYMPHATIC:  Denies swollen glands.


NEUROLOGICAL:  Denies confusion or altered mental status.  Denies loss of 

consciousness.  Denies dizziness or lightheadedness.  Denies headache.  Denies 

weakness or paralysis.  Denies problems difficulty with ambulation, slurred 

speech.  Denies sensory loss,  or tingling.  Denies seizures.


PSYCHIATRIC:  Denies anxiety or stress.  Denies depression, suicidal ideation, 

or homicidal ideation.  Denies visual or auditory hallucinations.








Physical Exam





- Vital signs


Vitals: 


                                        











Temp Pulse Resp BP Pulse Ox


 


 98.5 F   95   16   151/92 H  97 


 


 08/14/19 08:21  08/14/19 08:21  08/14/19 08:21  08/14/19 08:21  08/14/19 08:21














- Notes


Notes: 





PHYSICAL EXAMINATION:





GENERAL: Well-appearing, well-nourished and in no acute distress.





HEAD: Atraumatic, normocephalic.





EYES: Pupils equal round and reactive to light, extraocular movements intact, 

conjunctiva are normal.





ENT: Nares patent, oropharynx clear without exudates.  Moist mucous membranes.





NECK: Normal range of motion, supple without lymphadenopathy





LUNGS: Breath sounds clear to auscultation bilaterally and equal.  No wheezes 

rales or rhonchi.





HEART: Regular rate and rhythm without murmurs





ABDOMEN: Soft, mild tenderness with palpation to the epigastrium, nondistended 

abdomen.  No guarding, no rebound.  No masses appreciated.





Female : deferred





Musculoskeletal: Normal range of motion, no pitting or edema.  No cyanosis.





NEUROLOGICAL: Mental status; alert and oriented x3.  Cranial nerves II through 

XII intact.  Sensation intact to sharp/dull differentiation in all extremities. 

Motor; normal tone.  No abnormal movements appreciated.  No pronator drift.  

Strength tested and 5/5 in bilateral wrist flexion/extension, elbow 

flexion/extension, shoulder abduction, straight leg raise, knee 

flexion/extension, ankle dorsiflexion/plantar flexion.  Patient ambulates with a

steady gait. Coordination; no ataxia.  Finger to nose and heel to shin testing 

intact bilaterally. Reflexes; brachial radialis, biceps, and patellar reflexes  

within normal limits and symmetric bilaterally.  Babinski with downgoing toes 

bilaterally.





PSYCH: Normal mood, normal affect.





SKIN: Warm, Dry, normal turgor, no rashes or lesions noted.





Course





- Re-evaluation


Re-evalutation: 





08/14/19 10:10





                                        











Temp Pulse Resp BP Pulse Ox


 


 98.5 F   95   16   151/92 H  97 


 


 08/14/19 08:21  08/14/19 08:21  08/14/19 08:21  08/14/19 08:21  08/14/19 08:21














Laboratory











  08/14/19 08/14/19 08/14/19





  09:25 09:25 09:25


 


WBC  10.0  


 


RBC  3.41 L  


 


Hgb  11.5 L  


 


Hct  33.3 L  


 


MCV  98 H  


 


MCH  33.6 H  


 


MCHC  34.4  


 


RDW  14.7 H  


 


Plt Count  343  


 


Seg Neutrophils %  80.7 H  


 


Lymphocytes %  13.4  


 


Monocytes %  4.7  


 


Eosinophils %  0.9  


 


Basophils %  0.3  


 


Absolute Neutrophils  8.1  


 


Absolute Lymphocytes  1.3  


 


Absolute Monocytes  0.5  


 


Absolute Eosinophils  0.1  


 


Absolute Basophils  0.0  


 


Sodium   141.0 


 


Potassium   4.2 


 


Chloride   108 H 


 


Carbon Dioxide   23 


 


Anion Gap   10 


 


BUN   6 L 


 


Creatinine   0.68 


 


Est GFR ( Amer)   > 60 


 


Est GFR (Non-Af Amer)   > 60 


 


Glucose   100 


 


Calcium   10.2 


 


Total Bilirubin   1.2 


 


Direct Bilirubin   0.4 


 


Neonat Total Bilirubin   Not Reportable 


 


Neonat Direct Bilirubin   Not Reportable 


 


Neonat Indirect Bili   Not Reportable 


 


AST   36 


 


ALT   22 


 


Alkaline Phosphatase   113 


 


Total Protein   7.9 


 


Albumin   4.6 


 


Lipase   60.8 


 


Serum HCG, Qual    NEGATIVE


 


Urine Color   


 


Urine Appearance   


 


Urine pH   


 


Ur Specific Gravity   


 


Urine Protein   


 


Urine Glucose (UA)   


 


Urine Ketones   


 


Urine Blood   


 


Urine Nitrite   


 


Urine Bilirubin   


 


Urine Urobilinogen   


 


Ur Leukocyte Esterase   


 


Urine WBC (Auto)   


 


Urine RBC (Auto)   


 


U Hyaline Cast (Auto)   


 


Urine Bacteria (Auto)   


 


Squamous Epi Cells Auto   


 


Amorphous Sediment Auto   


 


Urine Mucus (Auto)   


 


Urine Ascorbic Acid   














  08/14/19





  09:50


 


WBC 


 


RBC 


 


Hgb 


 


Hct 


 


MCV 


 


MCH 


 


MCHC 


 


RDW 


 


Plt Count 


 


Seg Neutrophils % 


 


Lymphocytes % 


 


Monocytes % 


 


Eosinophils % 


 


Basophils % 


 


Absolute Neutrophils 


 


Absolute Lymphocytes 


 


Absolute Monocytes 


 


Absolute Eosinophils 


 


Absolute Basophils 


 


Sodium 


 


Potassium 


 


Chloride 


 


Carbon Dioxide 


 


Anion Gap 


 


BUN 


 


Creatinine 


 


Est GFR ( Amer) 


 


Est GFR (Non-Af Amer) 


 


Glucose 


 


Calcium 


 


Total Bilirubin 


 


Direct Bilirubin 


 


Neonat Total Bilirubin 


 


Neonat Direct Bilirubin 


 


Neonat Indirect Bili 


 


AST 


 


ALT 


 


Alkaline Phosphatase 


 


Total Protein 


 


Albumin 


 


Lipase 


 


Serum HCG, Qual 


 


Urine Color  YELLOW


 


Urine Appearance  CLOUDY


 


Urine pH  5.0


 


Ur Specific Gravity  1.012


 


Urine Protein  30 H


 


Urine Glucose (UA)  NEGATIVE


 


Urine Ketones  TRACE H


 


Urine Blood  SMALL H


 


Urine Nitrite  NEGATIVE


 


Urine Bilirubin  NEGATIVE


 


Urine Urobilinogen  NEGATIVE


 


Ur Leukocyte Esterase  MODERATE H


 


Urine WBC (Auto)  17


 


Urine RBC (Auto)  3


 


U Hyaline Cast (Auto)  8


 


Urine Bacteria (Auto)  TRACE


 


Squamous Epi Cells Auto  16


 


Amorphous Sediment Auto  TRACE


 


Urine Mucus (Auto)  OCC


 


Urine Ascorbic Acid  NEGATIVE














                                        





Abdomen Ultrasound  08/14/19 10:05


IMPRESSION:  Cholelithiasis without secondary evidence of acute cholecystitis.


 








38-year-old female with hypertension presents with 1 day of nausea, vomiting, 

diarrhea.  Vital signs reviewed and patient is mildly hypertensive but afebrile,

not tachycardic.  She does not appear toxic or dehydrated.  She is in no acute 

distress.  Previous medical records and nursing notes reviewed.  Patient's 

abdominal exam significant for mild epigastric tenderness without guarding or 

rebound.  Patient did receive IV fluids, Zofran, Pepcid and does report 

improvement of her nausea.  She is currently complaining of a headache so Reglan

and Benadryl as well as Toradol will be administered.  Awaiting labs and right 

upper quadrant ultrasound.


08/14/19 12:23


Presentation of an overall well-appearing patient in no acute distress with 

complaints of nausea, vomiting, diarrhea.  This is consistent with likely viral 

gastroenteritis.  Patient has no abdominal tenderness on exam and specifically 

no tenderness in the RLQ, LLQ, RUQ.  Overall well hydrated on exam.  Able to 

tolerate oral intake here in the emergency department. Low clinical suspicion 

for any acute life-threatening etiology based on exam and history including 

acute cholecystitis, SBO, appendicitis, nephrolithiasis, or pylonephritis. CMP 

without evidence of acute hepatitis or significant dehydration.  Will plan for 

discharge at this time with return precautions and followup recommendations.





- Vital Signs


Vital signs: 


                                        











Temp Pulse Resp BP Pulse Ox


 


 98.5 F   95   16   151/92 H  97 


 


 08/14/19 08:21  08/14/19 08:21  08/14/19 08:21  08/14/19 08:21  08/14/19 08:21














- Laboratory


Result Diagrams: 


                                 08/14/19 09:25





                                 08/14/19 09:25


Laboratory results interpreted by me: 


                                        











  08/14/19 08/14/19 08/14/19





  09:25 09:25 09:50


 


RBC  3.41 L  


 


Hgb  11.5 L  


 


Hct  33.3 L  


 


MCV  98 H  


 


MCH  33.6 H  


 


RDW  14.7 H  


 


Seg Neutrophils %  80.7 H  


 


Chloride   108 H 


 


BUN   6 L 


 


Urine Protein    30 H


 


Urine Ketones    TRACE H


 


Urine Blood    SMALL H


 


Ur Leukocyte Esterase    MODERATE H














- Diagnostic Test


Radiology reviewed: Image reviewed, Reports reviewed





Discharge





- Discharge


Clinical Impression: 


 Nausea vomiting and diarrhea, Gallstones





Condition: Good


Disposition: HOME, SELF-CARE


Instructions:  Gallbladder Disease ([facility]), Low-Fat Diet ([facility]), Viral Syndrome 

([facility]), Intravenous (IV) Fluids ([facility]), Vomiting ([facility])


Additional Instructions: 


Your symptoms are likely due to a viral illness and should resolve in the next 

several days.  You can take over-the-counter loperamide also known as Imodium as

needed for diarrhea per box instructions.  Continue to stay hydrated with plenty

of solution such as Gatorade or Pedialyte.  You are being prescribed Zofran to 

take as needed for nausea and vomiting.  Please return if you develop severe 

abdominal pain, pass out, become unable to tolerate any oral fluids for 12 more 

hours, or any other symptoms that are concerning to you.


Prescriptions: 


Famotidine [Pepcid 40 mg Tablet] 40 mg PO DAILY #10 tablet


Ondansetron [Zofran Odt 4 mg Tablet] 1 - 2 tab PO Q4H PRN #15 tab.rapdis


 PRN Reason: For Nausea/Vomiting


Forms:  Elevated Blood Pressure, Return to Work

## 2019-08-14 NOTE — RADIOLOGY REPORT (SQ)
EXAM DESCRIPTION:  U/S ABDOMEN LIMITED W/O DOP



COMPLETED DATE/TIME:  8/14/2019 12:06 pm



REASON FOR STUDY:  Epigastric abdominal pain history of gallstones



COMPARISON:  7/4/2014



TECHNIQUE:  Dynamic and static grayscale images acquired of the abdomen and recorded on PACS. Abnero
wanda selected color Doppler and spectral images recorded.



LIMITATIONS:  None.



FINDINGS:  PANCREAS: No masses.  Visualized pancreatic duct normal caliber.

LIVER: No masses. Echotexture normal.

LIVER VASCULATURE: Normal directional flow of the main portal vein and hepatic veins.

GALLBLADDER: Gallstone(s). No pericholecystic fluid. No wall thickening.

ULTRASOUND-DETECTED CUEVAS'S SIGN: Negative.

INTRAHEPATIC DUCTS AND COMMON DUCT: CBD and intrahepatic ducts normal caliber. No filling defects.

INFERIOR VENA CAVA: Normal flow.

AORTA: No aneurysm.

RIGHT KIDNEY:  Normal size. Normal echogenicity. No solid or suspicious masses. No hydronephrosis. No
 calcifications.

PERITONEAL AND RIGHT PLEURAL SPACE: No ascites or effusions.

OTHER: No other significant findings.



IMPRESSION:  Cholelithiasis without secondary evidence of acute cholecystitis.



TECHNICAL DOCUMENTATION:  JOB ID:  [id number]

 2011 [employer]- All Rights Reserved



Reading location - IP/workstation name: [email or web address]

## 2019-08-14 NOTE — ER DOCUMENT REPORT
ED Medical Screen (RME)





- General


Chief Complaint: Abdominal Pain


Stated Complaint: SWOLLEN MOUTH


Time Seen by Provider: 08/14/19 09:25


Notes: 





Patient is a 38-year-old female with a history of hypertension who presents to 

the emergency department with a chief complaint of nausea, vomiting and 

diarrhea.  Patient states that yesterday afternoon she had to leave work around 

1:30 PM as she developed vomiting.  Patient states that over the past 24 hours 

she has vomited over 10 times.  Patient denies sick contacts.  Patient states 

she has had a few episodes of diarrhea that she reports is tacky brown in color.

 Patient denies dark or bloody stools.  Patient denies fever or urinary 

symptoms.  Patient reports a pressure to her lower back.  Patient does complain 

of generalized abdominal pain but specifically points to the epigastric and lowe

r abdominal area.  Patient states that this feels like a throbbing ache.  

Patient states she has not tolerated anything by mouth.  Patient states she does

have a history of hypertension but does not take medications as she does not 

have a primary care physician.  Patient reports that she woke up this morning 

with numbness on the inside of the right lower mouth.  Patient states she did 

have a tooth pulled in the right upper mouth 1 month ago.  She feels like her 

mouth is slightly swollen.  


TRAVEL OUTSIDE OF THE [place] IN LAST 30 DAYS: No





- Related Data


Allergies/Adverse Reactions: 


                                        





No Known Allergies Allergy (Verified 08/14/19 08:09)


   











Past Medical History





- Past Medical History


Cardiac Medical History: Reports: Hx Hypertension


Renal/ Medical History: Denies: Hx Peritoneal Dialysis





- Immunizations


Hx Diphtheria, Pertussis, Tetanus Vaccination: Yes





Physical Exam





- Vital signs


Vitals: 


                                        











Temp Pulse Resp BP Pulse Ox


 


 98.5 F   95   16   151/92 H  97 


 


 08/14/19 08:21  08/14/19 08:21  08/14/19 08:21  08/14/19 08:21  08/14/19 08:21














- HEENT


Head: Normocephalic


Nasal: Normal


Mouth/Lips: Normal


Mucous membranes: Normal


Pharynx: Normal


Neck: Normal





- Abdominal


Inspection: Normal, Striae


Bowel sounds: Normal


Tenderness: Nontender


Organomegaly: No organomegaly





Course





- Re-evaluation


Re-evalutation: 





08/14/19 09:32


I have greeted and performed a rapid initial assessment of this patient.  A 

comprehensive ED assessment and evaluation of the patient, analysis of test 

results and completion of the medical decision making process will be conducted 

by additional ED providers.





- Vital Signs


Vital signs: 


                                        











Temp Pulse Resp BP Pulse Ox


 


 98.5 F   95   16   151/92 H  97 


 


 08/14/19 08:21  08/14/19 08:21  08/14/19 08:21  08/14/19 08:21  08/14/19 08:21

## 2020-04-27 ENCOUNTER — HOSPITAL ENCOUNTER (EMERGENCY)
Dept: HOSPITAL 62 - ER | Age: 40
Discharge: HOME | End: 2020-04-27
Payer: SELF-PAY

## 2020-04-27 VITALS — SYSTOLIC BLOOD PRESSURE: 169 MMHG | DIASTOLIC BLOOD PRESSURE: 103 MMHG

## 2020-04-27 DIAGNOSIS — M79.605: ICD-10-CM

## 2020-04-27 DIAGNOSIS — Z88.2: ICD-10-CM

## 2020-04-27 DIAGNOSIS — M62.81: Primary | ICD-10-CM

## 2020-04-27 DIAGNOSIS — F17.200: ICD-10-CM

## 2020-04-27 DIAGNOSIS — R20.0: ICD-10-CM

## 2020-04-27 DIAGNOSIS — I10: ICD-10-CM

## 2020-04-27 PROCEDURE — 70450 CT HEAD/BRAIN W/O DYE: CPT

## 2020-04-27 PROCEDURE — 99283 EMERGENCY DEPT VISIT LOW MDM: CPT

## 2020-04-27 NOTE — RADIOLOGY REPORT (SQ)
EXAM DESCRIPTION:  CT HEAD WITHOUT



IMAGES COMPLETED DATE/TIME:  4/27/2020 5:54 pm



REASON FOR STUDY:  Left leg weakness



COMPARISON:  None.



TECHNIQUE:  Axial images acquired through the brain without intravenous contrast.  Images reviewed wi
th bone, brain and subdural windows.   Images stored on PACS.

All CT scanners at this facility use dose modulation, iterative reconstruction, and/or weight based d
osing when appropriate to reduce radiation dose to as low as reasonably achievable (ALARA).

[facility]: Dose Right  [facility]: CareDose    [facility]: Dose Right    [facility]: Teradose 4D    [facility]: Smart Postabon



RADIATION DOSE:  CT Rad equipment meets quality standard of care and radiation dose reduction techniq
ues were employed. CTDIvol: 53.2 mGy. DLP: 964 mGy-cm. mGy.



LIMITATIONS:  None.



FINDINGS:  VENTRICLES: Normal size and contour.

CEREBRUM: No masses.  No hemorrhage.  No midline shift.  No evidence for acute infarction. Normal gra
y/white matter differentiation. No areas of low density in the white matter.

CEREBELLUM: No masses.  No hemorrhage.  No alteration of density.  No evidence for acute infarction.

EXTRAAXIAL SPACES: No fluid collections.  No masses.

ORBITS AND GLOBE: No intra- or extraconal masses.  Normal contour of globe without masses.

CALVARIUM: No fracture.

PARANASAL SINUSES: No fluid or mucosal thickening.

SOFT TISSUES: No mass or hematoma.

OTHER: No other significant finding.



IMPRESSION:  No acute intracranial hemorrhage, mass, or evidence of acute territorial infarct.

EVIDENCE OF ACUTE STROKE: NO.



COMMENT:  Quality ID # 436: Final reports with documentation of one or more dose reduction techniques
 (e.g., Automated exposure control, adjustment of the mA and/or kV according to patient size, use of 
iterative reconstruction technique)



TECHNICAL DOCUMENTATION:  JOB ID:  [id number]

 2011 [employer]- All Rights Reserved



Reading location - IP/workstation name: [email or web address]

## 2020-04-27 NOTE — ER DOCUMENT REPORT
ED General





- General


Chief Complaint: Leg Pain


Stated Complaint: LEFT LEG NUMBNESS


Time Seen by Provider: 04/27/20 18:14


TRAVEL OUTSIDE OF THE [place] IN LAST 30 DAYS: No





- HPI


Notes: 





Patient is a 39-year-old female who presents to the emergency department for 

evaluation of left leg numbness and weakness.  She states 3 days ago she woke in

the morning and her left leg was numb.  She admits she had been drinking the 

night before, and passed out on the couch.  She states her foot was "trailing 

behind her."  She states she did a quick exam at home on herself, with her 

sister, and noticed no other deficits.  She worked for the next 2 days.  She 

states that her leg symptoms have not improved.  She denies any pain, but she 

states occasionally "it throbs."  She really cannot describe it better than 

that.  She denies any difficulty seeing, speaking, swallowing.  Moving her other

arms and right leg without difficulty.





- Related Data


Allergies/Adverse Reactions: 


                                        





Sulfa (Sulfonamide Antibiotics) Adverse Reaction (Verified 04/27/20 18:18)


   








Home Medications: None





Past Medical History





- General


Information source: Patient





- Social History


Smoking Status: Current Every Day Smoker


Frequency of alcohol use: Rare


Drug Abuse: None


Family History: Reviewed & Not Pertinent, CAD, Malignancy


Patient has suicidal ideation: No


Patient has homicidal ideation: No





- Past Medical History


Cardiac Medical History: Reports: Hx Hypertension - Untreated currently


Renal/ Medical History: Denies: Hx Peritoneal Dialysis





- Immunizations


Hx Diphtheria, Pertussis, Tetanus Vaccination: Yes





Review of Systems





- Review of Systems


Neurological/Psychological: See HPI


-: Yes All other systems reviewed and negative





Physical Exam





- Vital signs


Vitals: 


                                        











Temp Pulse Resp BP Pulse Ox


 


 98.3 F   98   20   153/112 H  98 


 


 04/27/20 16:56  04/27/20 16:56  04/27/20 16:56  04/27/20 16:56  04/27/20 16:56














- Notes


Notes: 





Vital signs reviewed, please refer to chart. Head is normocephalic, atraumatic. 

Pupils equal round, reactive to light.  Neck is supple without meningismus.  

Heart is regular rate and rhythm.  Lungs are clear to auscultation bilaterally. 

Abdomen is soft, nontender, normoactive bowel sounds throughout.  Extremities 

without cyanosis, clubbing. Posterior calves are nontender.  Peripheral pulses 

are equal.  Skin is warm and dry.  Patient is awake, alert, oriented x3.  

Cranial nerves II - XII are grossly intact without focal neurological deficits. 

Strength is plus 5 out of 5 bilateral upper and right lower extremity.  

Sensation is intact to these extremities as well.  Examination of the left lower

extremity yields no obvious deformity.  She has 4+ out of 5 strength at the hip 

flexor and with plantar flexion.  Remainder of the extremity yields +5/5 

strength.  Sensation diminished to light touch from the left knee to the ankle, 

but intact to pressure.  Reflex testing revealed 1+ patellar and 1+ Achilles 

bilaterally.  Intact finger-nose-finger, rapid alternating movements, 

heel-to-shin.





Course





- Re-evaluation


Re-evalutation: 





04/27/20 18:39


Patient presents to the emergency department for evaluation of numbness and 

weakness in the left lower extremity.  She is 3 days out from the onset of 

symptoms, so if this is in fact a CVA, she is certainly not a candidate for any 

sort of intervention at this time.  She has no other focal neurological 

deficits.  She is hypertensive, but has a known history of this and is currently

untreated.  She admits she was drinking the night before her symptoms, which 

makes a significant palsy a possible etiology for her symptoms as well.  At this

point we will order a CT scan of her head.  She is stable at this time, we will 

continue to monitor.








04/27/20 19:25


I went back and reevaluate the patient.  Upon rechecking reflexes, the patient 

actually had improved sensation.  We talked at length about the possibility of a

very subtle stroke, which would require MRI for diagnosis. She does not wish to 

have this test performed, and understands that I am unable to fully rule out a 

small stroke as the cause of her symptoms.  At this point I do not believe it 

would .  I suspect patient to have a palsy which is causing her

symptoms, and will place her on a steroid taper to treat.  She is counseled to 

quit smoking, establish with a PCP, and return to the ER with worsening.  





- Vital Signs


Vital signs: 


                                        











Temp Pulse Resp BP Pulse Ox


 


 98.3 F   98   20   153/112 H  98 


 


 04/27/20 18:13  04/27/20 16:56  04/27/20 16:56  04/27/20 16:56  04/27/20 16:56














- Diagnostic Test


Radiology reviewed: Reports reviewed


Radiology results interpreted by me: 





04/27/20 19:25





                                        





Head CT  04/27/20 18:33


IMPRESSION:  No acute intracranial hemorrhage, mass, or evidence of acute 

territorial infarct.


EVIDENCE OF ACUTE STROKE: NO.


 














Discharge





- Discharge


Clinical Impression: 


 Numbness in left leg, Weakness of left leg





Condition: Stable


Disposition: HOME, SELF-CARE


Instructions:  Numbness or Paresthesia ([facility]), Weakness ([facility])


Additional Instructions: 


Your CT scan of your head was normal.  As discussed, a very subtle stroke could 

cause the symptoms, but would require further testing, such as an MRI, which you

have stated you do not want at this time.  I suspect your symptoms are due to a 

nerve palsy.  Please take all of the prednisone as directed until gone.  Follow 

up with PCP this week.  If you develop increased weakness, numbness, difficulty 

seeing, speaking, or swallowing, or any other new or concerning symptoms, return

to the ER for further evaluation.

## 2020-04-29 ENCOUNTER — HOSPITAL ENCOUNTER (EMERGENCY)
Dept: HOSPITAL 62 - ER | Age: 40
LOS: 1 days | Discharge: ANOTHER HOSPITAL | End: 2020-04-30
Payer: SELF-PAY

## 2020-04-29 DIAGNOSIS — F17.200: ICD-10-CM

## 2020-04-29 DIAGNOSIS — R53.1: Primary | ICD-10-CM

## 2020-04-29 DIAGNOSIS — R20.0: ICD-10-CM

## 2020-04-29 DIAGNOSIS — E87.6: ICD-10-CM

## 2020-04-29 PROCEDURE — 84484 ASSAY OF TROPONIN QUANT: CPT

## 2020-04-29 PROCEDURE — 87804 INFLUENZA ASSAY W/OPTIC: CPT

## 2020-04-29 PROCEDURE — 82550 ASSAY OF CK (CPK): CPT

## 2020-04-29 PROCEDURE — 85025 COMPLETE CBC W/AUTO DIFF WBC: CPT

## 2020-04-29 PROCEDURE — 80053 COMPREHEN METABOLIC PANEL: CPT

## 2020-04-29 PROCEDURE — 71045 X-RAY EXAM CHEST 1 VIEW: CPT

## 2020-04-29 PROCEDURE — 80307 DRUG TEST PRSMV CHEM ANLYZR: CPT

## 2020-04-29 PROCEDURE — 70450 CT HEAD/BRAIN W/O DYE: CPT

## 2020-04-29 PROCEDURE — 72125 CT NECK SPINE W/O DYE: CPT

## 2020-04-29 PROCEDURE — 87880 STREP A ASSAY W/OPTIC: CPT

## 2020-04-29 PROCEDURE — 36415 COLL VENOUS BLD VENIPUNCTURE: CPT

## 2020-04-29 PROCEDURE — 96365 THER/PROPH/DIAG IV INF INIT: CPT

## 2020-04-29 PROCEDURE — 87070 CULTURE OTHR SPECIMN AEROBIC: CPT

## 2020-04-29 PROCEDURE — 81001 URINALYSIS AUTO W/SCOPE: CPT

## 2020-04-29 PROCEDURE — 99285 EMERGENCY DEPT VISIT HI MDM: CPT

## 2020-04-29 PROCEDURE — 85610 PROTHROMBIN TIME: CPT

## 2020-04-30 VITALS — DIASTOLIC BLOOD PRESSURE: 99 MMHG | SYSTOLIC BLOOD PRESSURE: 155 MMHG

## 2020-04-30 LAB
A TYPE INFLUENZA AG: NEGATIVE
ADD MANUAL DIFF: NO
ALBUMIN SERPL-MCNC: 4.6 G/DL (ref 3.5–5)
ALP SERPL-CCNC: 99 U/L (ref 38–126)
ANION GAP SERPL CALC-SCNC: 10 MMOL/L (ref 5–19)
APPEARANCE UR: (no result)
APTT PPP: (no result) S
AST SERPL-CCNC: 43 U/L (ref 14–36)
B INFLUENZA AG: NEGATIVE
BARBITURATES UR QL SCN: NEGATIVE
BASOPHILS # BLD AUTO: 0 10^3/UL (ref 0–0.2)
BASOPHILS NFR BLD AUTO: 0.3 % (ref 0–2)
BILIRUB DIRECT SERPL-MCNC: 0.1 MG/DL (ref 0–0.4)
BILIRUB SERPL-MCNC: 1.3 MG/DL (ref 0.2–1.3)
BILIRUB UR QL STRIP: NEGATIVE
BUN SERPL-MCNC: 8 MG/DL (ref 7–20)
CALCIUM: 9.5 MG/DL (ref 8.4–10.2)
CHLORIDE SERPL-SCNC: 90 MMOL/L (ref 98–107)
CO2 SERPL-SCNC: 33 MMOL/L (ref 22–30)
EOSINOPHIL # BLD AUTO: 0.1 10^3/UL (ref 0–0.6)
EOSINOPHIL NFR BLD AUTO: 1.2 % (ref 0–6)
ERYTHROCYTE [DISTWIDTH] IN BLOOD BY AUTOMATED COUNT: 18.2 % (ref 11.5–14)
GLUCOSE SERPL-MCNC: 122 MG/DL (ref 75–110)
GLUCOSE UR STRIP-MCNC: NEGATIVE MG/DL
HCT VFR BLD CALC: 37.7 % (ref 36–47)
HGB BLD-MCNC: 13.7 G/DL (ref 12–15.5)
INR PPP: 0.94
KETONES UR STRIP-MCNC: (no result) MG/DL
LYMPHOCYTES # BLD AUTO: 2.1 10^3/UL (ref 0.5–4.7)
LYMPHOCYTES NFR BLD AUTO: 25.3 % (ref 13–45)
MCH RBC QN AUTO: 37.2 PG (ref 27–33.4)
MCHC RBC AUTO-ENTMCNC: 36.4 G/DL (ref 32–36)
MCV RBC AUTO: 102 FL (ref 80–97)
METHADONE UR QL SCN: NEGATIVE
MONOCYTES # BLD AUTO: 0.5 10^3/UL (ref 0.1–1.4)
MONOCYTES NFR BLD AUTO: 6.4 % (ref 3–13)
NEUTROPHILS # BLD AUTO: 5.7 10^3/UL (ref 1.7–8.2)
NEUTS SEG NFR BLD AUTO: 66.8 % (ref 42–78)
NITRITE UR QL STRIP: NEGATIVE
PCP UR QL SCN: NEGATIVE
PH UR STRIP: 6 [PH] (ref 5–9)
PLATELET # BLD: 206 10^3/UL (ref 150–450)
POTASSIUM SERPL-SCNC: 2.8 MMOL/L (ref 3.6–5)
PROT SERPL-MCNC: 8 G/DL (ref 6.3–8.2)
PROT UR STRIP-MCNC: 100 MG/DL
PROTHROMBIN TIME: 12.6 SEC (ref 11.4–15.4)
RBC # BLD AUTO: 3.69 10^6/UL (ref 3.72–5.28)
SP GR UR STRIP: 1.02
TOTAL CELLS COUNTED % (AUTO): 100 %
URINE AMPHETAMINES SCREEN: NEGATIVE
URINE BENZODIAZEPINES SCREEN: NEGATIVE
URINE COCAINE SCREEN: NEGATIVE
URINE MARIJUANA (THC) SCREEN: (no result)
UROBILINOGEN UR-MCNC: 2 MG/DL (ref ?–2)
WBC # BLD AUTO: 8.5 10^3/UL (ref 4–10.5)

## 2020-04-30 NOTE — RADIOLOGY REPORT (SQ)
EXAM DESCRIPTION: 



XR CHEST 1 VIEW



COMPLETED DATE/TME:  04/30/2020 01:56



CLINICAL HISTORY: 



39 years, Female, sob



COMPARISON:

8/6/2015 chest



NUMBER OF VIEWS:

1



TECHNIQUE:

Portable chest



LIMITATIONS:

None.



FINDINGS:



Heart size is normal. Lungs are clear. No pneumothorax



IMPRESSION:



Negative chest

 



copyright 2011 [employer]- All Rights Reserved

## 2020-04-30 NOTE — RADIOLOGY REPORT (SQ)
CT of the head: 4/30/2020 12:48 AM CDT



HISTORY: 39-year-old patient with left upper and left lower

extremity weakness.



COMPARISON: None available



TECHNIQUE: Multiple axial contiguous images were obtained through

the head without intravenous contrast administered. This exam was

performed according to our departmental dose-optimization

program, which includes automated exposure control, adjustment of

the mA and/or KV according to the patient's size and/or use of

iterative reconstruction technique.



FINDINGS: The ventricles are within normal limits for size.  Both

orbits appear unremarkable. The mastoid air cells appear clear.

There is mild mucoperiosteal thickening of the ethmoid sinuses.

The calvarium is intact. No extra-axial fluid collection is seen.

 The gray-white matter differentiation is within normal limits.

No midline shift or mass effect is apparent. There are no

findings to suggest acute intracranial hemorrhage.



IMPRESSION: No acute intracranial hemorrhage is seen.



There is persistent concern for any neurologic deficit, MRI

imaging with diffusion-weighted sequences should be considered.

## 2020-04-30 NOTE — ER DOCUMENT REPORT
ED General





- General


Chief Complaint: Weakness


Stated Complaint: TROUBLE BREATHING


Time Seen by Provider: 04/30/20 01:08


Information source: Patient


Notes: 


bruna Salmeron RN


pt presents to ed via ems w/ c/o of breathing difficulty and LUE and LLE 

weakness x2days. per ems pt was seen at [facility] 2 days ago for the same and was 

prescribed prednisone but has not yet filled the prescription. per pt the 

numbness and breathing difficulty worsening today and is now accompanied by 

chest pain, vomiting, chills, and sore throat. pt breathes e/u, able to speak in

full sentences, 100% on RA. this rn to complete mends and nih HTN not on meds 

anxious with CP. Pt was seen by Dr Campos 3 days ago.


My notes;


39-year-old black female arrives with 2-day history of having tachycardia and 

difficulty breathing and sore throat and vomiting and patient reports she is 

having weakness of her left upper extremity and dragging her left leg.  She has 

numbness of her left lower extremity with poor  to her left hand and unable 

to lift her left hand against gravity.  No one in the family has any multiple 

sclerosis or any strokes.  She was seen here 2 days ago and prescribed 

prednisone but has not taken it.  Patient denies any trauma or abuse and denies 

any cephalgia at this time.


TRAVEL OUTSIDE OF THE [place] IN LAST 30 DAYS: No





- HPI


Onset: Other - 2 days ago


Quality of pain: Achy


Severity: Mild


Associated symptoms: Hurts to breath, Nausea, Vomiting, Shortness of breath, 

Weakness


Exacerbated by: Movement, Deep breathing


Relieved by: Denies


Similar symptoms previously: No


Recently seen / treated by doctor: No





- Related Data


Allergies/Adverse Reactions: 


                                        





Sulfa (Sulfonamide Antibiotics) Adverse Reaction (Verified 04/30/20 00:28)


   











Past Medical History





- General


Information source: Patient





- Social History


Smoking Status: Current Some Day Smoker


Cigarette use (# per day): Yes - 1/2 ppd


Chew tobacco use (# tins/day): No


Smoking Education Provided: Yes


Frequency of alcohol use: Occasional


Drug Abuse: None


Lives with: Family - Patient's sister helps with activities of daily living like

getting her to shower and to the bathroom


Family History: Reviewed & Not Pertinent, CAD, Malignancy


Patient has homicidal ideation: No





- Past Medical History


Cardiac Medical History: Reports: Hx Hypertension - Untreated currently


Renal/ Medical History: Denies: Hx Peritoneal Dialysis





- Immunizations


Hx Diphtheria, Pertussis, Tetanus Vaccination: Yes





Review of Systems





- Review of Systems


Constitutional: See HPI, Malaise, Weakness


EENT: See HPI, Throat pain


Cardiovascular: No symptoms reported


Respiratory: No symptoms reported


Gastrointestinal: No symptoms reported


Genitourinary: No symptoms reported


Female Genitourinary: No symptoms reported


Musculoskeletal: See HPI, Other - LLE numbness and weakness of LUE


Skin: No symptoms reported


Hematologic/Lymphatic: No symptoms reported


Neurological/Psychological: No symptoms reported





Physical Exam





- Vital signs


Vitals: 


                                        











Pulse Ox


 


 100 


 


 04/29/20 23:50











Interpretation: Tachypneic





- General


General appearance: Appears well





- HEENT


Head: Normocephalic, Atraumatic


Eyes: Normal


Pupils: PERRL





Course





- Vital Signs


Vital signs: 


                                        











Temp Pulse Resp BP Pulse Ox


 


 98.9 F   87   15   131/101 H  99 


 


 04/29/20 23:52  04/30/20 00:00  04/30/20 02:00  04/30/20 00:31  04/30/20 02:00














- Laboratory


Result Diagrams: 


                                 04/30/20 00:16





                                 04/30/20 00:16


Laboratory results interpreted by me: 


                                        











  04/30/20 04/30/20





  00:16 00:16


 


RBC  3.69 L 


 


MCV  102 H 


 


MCH  37.2 H 


 


MCHC  36.4 H 


 


RDW  18.2 H 


 


Sodium   133.2 L


 


Potassium   2.8 L*


 


Chloride   90 L


 


Carbon Dioxide   33 H


 


Glucose   122 H


 


AST   43 H














Critical Care Note





- Critical Care Note


Total time excluding time spent on procedures (mins): 90


Comments: 





I spoke with Lulu Mercer who is on for neurology tonight and she 

advised transfer.  [facility]  

She will arrange for a hospitalist to talk with me as well.  This occurred at 0 

219


I spoke with Dr. Arredondo at 0 [phone] and he accepted the patient via [place] transfer 

center





Discharge





- Discharge


Clinical Impression: 


 Weakness, Numbness and tingling of left arm and leg, Hypokalemia, Smoker





Condition: Fair


Disposition: [facility]

## 2020-04-30 NOTE — RADIOLOGY REPORT (SQ)
CT CERVICAL SPINE: 4/30/2020 12:59 AM CDT



TECHNIQUE: Axial contiguous images were obtained through the

cervical spine without intravenous contrast. Sagittal and coronal

reconstructions were also reviewed. This exam was performed

according to our departmental dose-optimization program, which

includes automated exposure control, adjustment of the mA and/or

KV according to the patient's size and/or use of iterative

reconstruction technique.



COMPARISON: None available



INDICATION: 39-year old patient with neck pain, left upper and

lower extremity weakness.



FINDINGS: The vertebral bodies appear well aligned.  The

vertebral body heights appear well maintained. No significant

pre-vertebral soft tissue swelling is noted. No definite fracture

or subluxation is noted. No significant intervertebral disc space

narrowing is seen. The visualized brain parenchyma appears

unremarkable. The craniocervical junction is unremarkable.



IMPRESSION: There are no findings to suggest an acute fracture or

subluxation within the cervical spine. 

## 2020-06-11 ENCOUNTER — HOSPITAL ENCOUNTER (OUTPATIENT)
Dept: HOSPITAL 62 - OD | Age: 40
End: 2020-06-11
Payer: SELF-PAY

## 2020-06-11 DIAGNOSIS — E87.6: Primary | ICD-10-CM

## 2020-06-11 LAB
ANION GAP SERPL CALC-SCNC: 10 MMOL/L (ref 5–19)
BUN SERPL-MCNC: 8 MG/DL (ref 7–20)
CALCIUM: 8.8 MG/DL (ref 8.4–10.2)
CHLORIDE SERPL-SCNC: 96 MMOL/L (ref 98–107)
CO2 SERPL-SCNC: 26 MMOL/L (ref 22–30)
GLUCOSE SERPL-MCNC: 95 MG/DL (ref 75–110)
POTASSIUM SERPL-SCNC: 3.9 MMOL/L (ref 3.6–5)

## 2020-06-11 PROCEDURE — 36415 COLL VENOUS BLD VENIPUNCTURE: CPT

## 2020-06-11 PROCEDURE — 80048 BASIC METABOLIC PNL TOTAL CA: CPT

## 2020-06-23 ENCOUNTER — HOSPITAL ENCOUNTER (EMERGENCY)
Dept: HOSPITAL 62 - ER | Age: 40
Discharge: HOME | End: 2020-06-23
Payer: SELF-PAY

## 2020-06-23 VITALS — DIASTOLIC BLOOD PRESSURE: 74 MMHG | SYSTOLIC BLOOD PRESSURE: 138 MMHG

## 2020-06-23 DIAGNOSIS — F17.200: ICD-10-CM

## 2020-06-23 DIAGNOSIS — I50.9: ICD-10-CM

## 2020-06-23 DIAGNOSIS — R10.816: ICD-10-CM

## 2020-06-23 DIAGNOSIS — R42: Primary | ICD-10-CM

## 2020-06-23 DIAGNOSIS — Z79.899: ICD-10-CM

## 2020-06-23 DIAGNOSIS — I44.0: ICD-10-CM

## 2020-06-23 DIAGNOSIS — R11.2: ICD-10-CM

## 2020-06-23 DIAGNOSIS — K29.00: ICD-10-CM

## 2020-06-23 DIAGNOSIS — I11.0: ICD-10-CM

## 2020-06-23 LAB
ADD MANUAL DIFF: NO
ALBUMIN SERPL-MCNC: 4.6 G/DL (ref 3.5–5)
ALP SERPL-CCNC: 123 U/L (ref 38–126)
ANION GAP SERPL CALC-SCNC: 7 MMOL/L (ref 5–19)
APPEARANCE UR: CLEAR
APTT PPP: YELLOW S
AST SERPL-CCNC: 151 U/L (ref 14–36)
BASOPHILS # BLD AUTO: 0 10^3/UL (ref 0–0.2)
BASOPHILS NFR BLD AUTO: 0.6 % (ref 0–2)
BILIRUB DIRECT SERPL-MCNC: 0 MG/DL (ref 0–0.4)
BILIRUB SERPL-MCNC: 0.7 MG/DL (ref 0.2–1.3)
BILIRUB UR QL STRIP: NEGATIVE
BUN SERPL-MCNC: 11 MG/DL (ref 7–20)
CALCIUM: 9.6 MG/DL (ref 8.4–10.2)
CHLORIDE SERPL-SCNC: 98 MMOL/L (ref 98–107)
CK MB SERPL-MCNC: 2.09 NG/ML (ref ?–4.55)
CK SERPL-CCNC: 159 U/L (ref 30–135)
CO2 SERPL-SCNC: 29 MMOL/L (ref 22–30)
EOSINOPHIL # BLD AUTO: 0 10^3/UL (ref 0–0.6)
EOSINOPHIL NFR BLD AUTO: 0.1 % (ref 0–6)
ERYTHROCYTE [DISTWIDTH] IN BLOOD BY AUTOMATED COUNT: 15.9 % (ref 11.5–14)
GLUCOSE SERPL-MCNC: 118 MG/DL (ref 75–110)
GLUCOSE UR STRIP-MCNC: NEGATIVE MG/DL
HCT VFR BLD CALC: 34.6 % (ref 36–47)
HGB BLD-MCNC: 12.4 G/DL (ref 12–15.5)
KETONES UR STRIP-MCNC: NEGATIVE MG/DL
LYMPHOCYTES # BLD AUTO: 0.5 10^3/UL (ref 0.5–4.7)
LYMPHOCYTES NFR BLD AUTO: 9.2 % (ref 13–45)
MCH RBC QN AUTO: 34.7 PG (ref 27–33.4)
MCHC RBC AUTO-ENTMCNC: 35.7 G/DL (ref 32–36)
MCV RBC AUTO: 97 FL (ref 80–97)
MONOCYTES # BLD AUTO: 0.3 10^3/UL (ref 0.1–1.4)
MONOCYTES NFR BLD AUTO: 5.1 % (ref 3–13)
NEUTROPHILS # BLD AUTO: 5 10^3/UL (ref 1.7–8.2)
NEUTS SEG NFR BLD AUTO: 85 % (ref 42–78)
NITRITE UR QL STRIP: NEGATIVE
PH UR STRIP: 5 [PH] (ref 5–9)
PLATELET # BLD: 278 10^3/UL (ref 150–450)
POTASSIUM SERPL-SCNC: 3.3 MMOL/L (ref 3.6–5)
PROT SERPL-MCNC: 8.2 G/DL (ref 6.3–8.2)
PROT UR STRIP-MCNC: NEGATIVE MG/DL
RBC # BLD AUTO: 3.56 10^6/UL (ref 3.72–5.28)
SP GR UR STRIP: 1.01
TOTAL CELLS COUNTED % (AUTO): 100 %
TROPONIN I SERPL-MCNC: < 0.012 NG/ML
UROBILINOGEN UR-MCNC: NEGATIVE MG/DL (ref ?–2)
WBC # BLD AUTO: 5.9 10^3/UL (ref 4–10.5)

## 2020-06-23 PROCEDURE — 83735 ASSAY OF MAGNESIUM: CPT

## 2020-06-23 PROCEDURE — 80053 COMPREHEN METABOLIC PANEL: CPT

## 2020-06-23 PROCEDURE — 85025 COMPLETE CBC W/AUTO DIFF WBC: CPT

## 2020-06-23 PROCEDURE — 96361 HYDRATE IV INFUSION ADD-ON: CPT

## 2020-06-23 PROCEDURE — 82550 ASSAY OF CK (CPK): CPT

## 2020-06-23 PROCEDURE — 93010 ELECTROCARDIOGRAM REPORT: CPT

## 2020-06-23 PROCEDURE — 84703 CHORIONIC GONADOTROPIN ASSAY: CPT

## 2020-06-23 PROCEDURE — 71045 X-RAY EXAM CHEST 1 VIEW: CPT

## 2020-06-23 PROCEDURE — 36415 COLL VENOUS BLD VENIPUNCTURE: CPT

## 2020-06-23 PROCEDURE — 96375 TX/PRO/DX INJ NEW DRUG ADDON: CPT

## 2020-06-23 PROCEDURE — 99284 EMERGENCY DEPT VISIT MOD MDM: CPT

## 2020-06-23 PROCEDURE — 81001 URINALYSIS AUTO W/SCOPE: CPT

## 2020-06-23 PROCEDURE — 83690 ASSAY OF LIPASE: CPT

## 2020-06-23 PROCEDURE — 93005 ELECTROCARDIOGRAM TRACING: CPT

## 2020-06-23 PROCEDURE — 96365 THER/PROPH/DIAG IV INF INIT: CPT

## 2020-06-23 PROCEDURE — 84484 ASSAY OF TROPONIN QUANT: CPT

## 2020-06-23 PROCEDURE — 82553 CREATINE MB FRACTION: CPT

## 2020-06-23 NOTE — EKG REPORT
SEVERITY:- ABNORMAL ECG -

SINUS RHYTHM

FIRST DEGREE AV BLOCK

PROBABLE LEFT ATRIAL ABNORMALITY

:

Confirmed by: Heidy Norman 23-Jun-2020 10:35:34

## 2020-06-23 NOTE — RADIOLOGY REPORT (SQ)
EXAM DESCRIPTION:  CHEST SINGLE VIEW



IMAGES COMPLETED DATE/TIME:  6/23/2020 11:05 am



REASON FOR STUDY:  epig pain, dizziness



COMPARISON:  AP view of the chest from 4/30/2020.



EXAM PARAMETERS:  NUMBER OF VIEWS: One view.

TECHNIQUE:  An AP view of the chest was obtained.

RADIATION DOSE: NA

LIMITATIONS: None.



FINDINGS:  LUNGS AND PLEURA: No consolidation, pleural effusion or pneumothorax.

MEDIASTINUM AND HILAR STRUCTURES: No mediastinal or hilar contour abnormality.

HEART AND VASCULAR STRUCTURES: The cardiac silhouette and pulmonary vasculature are within normal bower
its.

BONES: No acute findings.

HARDWARE: None in the chest.

OTHER: No other finding.



IMPRESSION:  No acute cardiopulmonary process.



TECHNICAL DOCUMENTATION:  JOB ID:  [id number]

 2011 [employer]- All Rights Reserved



Reading location - IP/workstation name: [email or web address]

## 2020-06-23 NOTE — ER DOCUMENT REPORT
ED Dizziness/Weakness





- General


Chief Complaint: Dizziness


Stated Complaint: NAUSEA,LIGHTHEADED


Time Seen by Provider: 06/23/20 10:08


Primary Care Provider: 


[facility] [Provider Group] - Follow up as needed


Mode of Arrival: Ambulatory


Information source: Patient


Notes: 





Patient presents stating that she has felt dizzy with position changes.  Patient

states when she stands up the room spins with a rotational movement.  Patient 

states that she has had nausea with vomiting x4 episodes.  Patient denies any 

diarrhea.  Patient does complain of epigastric tenderness that she attributes to

vomiting.  Patient denies any cough or congestion symptoms.


TRAVEL OUTSIDE OF THE [place] IN LAST 30 DAYS: No





- HPI


Patient complains to provider of: Vertigo


Onset: This morning


Onset/Duration: Waxing and waning


Quality of pain: Achy


Pain Level: 2


Context: Vertigo


Associated symptoms: Dizzy, Nausea, Vomiting.  denies: Chest pain, Diarrhea, 

Fainted, Headache


Exacerbated by: Change in position


Baseline gait: Walks w/o assistance





- Related Data


Allergies/Adverse Reactions: 


                                        





Sulfa (Sulfonamide Antibiotics) Adverse Reaction (Verified 04/30/20 00:28)


   








Home Medications: Lisinopril.  Lasix





Past Medical History





- General


Information source: Patient





- Social History


Smoking Status: Current Every Day Smoker


Frequency of alcohol use: Occasional


Drug Abuse: None


Occupation: Foodservice


Family History: Reviewed & Not Pertinent, CAD, Malignancy


Patient has homicidal ideation: No





- Past Medical History


Cardiac Medical History: Reports: Hx Atrial Fibrillation - currently wearing 

heart monitor 6/2020, Hx Congestive Heart Failure, Hx Hypercholesterolemia, Hx 

Hypertension - Untreated currently


Neurological Medical History: Reports: Hx Cerebrovascular Accident


Renal/ Medical History: Denies: Hx Peritoneal Dialysis


Surgical Hx: Negative





- Immunizations


Hx Diphtheria, Pertussis, Tetanus Vaccination: Yes





Review of Systems





- Review of Systems


Constitutional: No symptoms reported.  denies: Chills, Fever


EENT: No symptoms reported.  denies: Nose congestion, Nose discharge


Cardiovascular: Dizziness.  denies: Chest pain


Respiratory: No symptoms reported.  denies: Cough, Short of breath


Gastrointestinal: Abdominal pain, Nausea, Vomiting.  denies: Diarrhea


Genitourinary: No symptoms reported.  denies: Dysuria, Flank pain


Female Genitourinary: No symptoms reported


Musculoskeletal: No symptoms reported.  denies: Back pain


Skin: No symptoms reported


Hematologic/Lymphatic: No symptoms reported


Neurological/Psychological: No symptoms reported.  denies: Confusion, Weakness





Physical Exam





- Vital signs


Vitals: 


                                        











Temp Pulse Resp BP Pulse Ox


 


 98.1 F   92   17   138/83 H  96 


 


 06/23/20 08:58  06/23/20 08:58  06/23/20 08:58  06/23/20 08:58  06/23/20 08:58














- General


General appearance: Appears well, Alert


In distress: None





- HEENT


Head: Normocephalic, Atraumatic


Eyes: Other - Lateral nystagmus


Extraocular movements intact: Yes


Eyelashes: Normal


Pupils: PERRL


Ears: Normal


External canal: Normal


Tympanic membrane: Normal


Nasal: Normal


Mouth/Lips: Normal


Mucous membranes: Normal


Pharynx: Normal


Neck: Normal, Supple.  No: Lymphadenopathy





- Respiratory


Respiratory status: No respiratory distress


Chest status: Nontender


Breath sounds: Normal.  No: Rales, Rhonchi, Stridor, Wheezing


Chest palpation: Normal





- Cardiovascular


Rhythm: Regular


Heart sounds: S1 appreciated, S2 appreciated





- Abdominal


Inspection: Normal


Distension: No distension


Bowel sounds: Normal


Tenderness: Tender - Mild epigastric tenderness.  No: Guarding


Organomegaly: No organomegaly





- Back


Back: Normal, Nontender.  No: CVA tenderness, Vertebra tenderness





- Extremities


General upper extremity: Normal inspection, Normal strength


General lower extremity: Normal inspection, Normal strength





- Neurological


Neuro grossly intact: Yes


Cognition: Normal


[place] Coma Scale Eye Opening: Spontaneous


[place] Coma Scale Verbal: Oriented


[place] Coma Scale Motor: Obeys Commands


[place] Coma Scale Total: 15


Speech: Normal.  No: Dysarthria


Cranial nerves: Normal


Cerebellar coordination: Normal


Motor strength normal: LUE, RUE, LLE, RLE





- Psychological


Associated symptoms: Normal affect, Normal mood





- Skin


Skin Temperature: Warm


Skin Moisture: Dry


Skin Color: Normal





Course





- Re-evaluation


Re-evalutation: 





06/23/20 12:13


Patient ambulatory to the bathroom, patient states that she does still have some

mild lightheadedness with position changes only.  Patient states she vomited 

about 20 minutes ago.  Will give additional antiemetic at this time.


06/23/20 13:57


Patient reports that nausea is resolved as well as lightheadedness although she 

does complain of the mild tenderness to the epigastric area that she attributes 

to the vomiting.  Will give GI cocktail at this time.


06/23/20 13:59


Patient with resolved vertiginous symptoms.  Patient mildly hypokalemic and 

hypomagnesemic, electrolytes were replaced while here.  Patient symptoms did 

improve after use of antiemetic and meclizine.  Patient with mild epigastric 

tenderness that she attributes to her vomiting episodes.  Patient presents with 

abdominal pain without signs of peritonitis or other life-threatening or serious

etiology.  The patient presents with resolved dizziness without signs of CNS 

bleed, stroke, infection, or other serious etiology.  The patient is 

neurologically intact.  Given the extremely low risk of these diagnoses further 

testing and evaluation for these possibilities does not appear to be indicated 

at this time.  The patient has been instructed to return if the symptoms worsen 

or change in any way.








- Vital Signs


Vital signs: 


                                        











Temp Pulse Resp BP Pulse Ox


 


 98.0 F   82   18   138/74 H  98 


 


 06/23/20 15:06  06/23/20 15:06  06/23/20 15:06  06/23/20 15:06  06/23/20 15:06














- Laboratory


Result Diagrams: 


                                 06/23/20 09:39





                                 06/23/20 09:39


Laboratory results interpreted by me: 


                                        











  06/23/20 06/23/20 06/23/20





  09:39 09:39 09:39


 


RBC  3.56 L  


 


Hct  34.6 L  


 


MCH  34.7 H  


 


RDW  15.9 H  


 


Lymph % (Auto)  9.2 L  


 


Seg Neutrophils %  85.0 H  


 


Sodium   134.4 L 


 


Potassium   3.3 L 


 


Creatinine   0.46 L 


 


Glucose   118 H 


 


Magnesium   


 


AST   151 H 


 


ALT   76 H 


 


Creatine Kinase   159 H 


 


Urine Blood    SMALL H














  06/23/20





  09:39


 


RBC 


 


Hct 


 


MCH 


 


RDW 


 


Lymph % (Auto) 


 


Seg Neutrophils % 


 


Sodium 


 


Potassium 


 


Creatinine 


 


Glucose 


 


Magnesium  1.4 L


 


AST 


 


ALT 


 


Creatine Kinase 


 


Urine Blood 











06/23/20 13:58





                              Labs- All tests 24 hr











  06/23/20 06/23/20 06/23/20





  09:39 09:39 09:39


 


WBC  5.9  


 


RBC  3.56 L  


 


Hgb  12.4  


 


Hct  34.6 L  


 


MCV  97  


 


MCH  34.7 H  


 


MCHC  35.7  


 


RDW  15.9 H  


 


Plt Count  278  


 


Lymph % (Auto)  9.2 L  


 


Mono % (Auto)  5.1  


 


Eos % (Auto)  0.1  


 


Baso % (Auto)  0.6  


 


Absolute Neuts (auto)  5.0  


 


Absolute Lymphs (auto)  0.5  


 


Absolute Monos (auto)  0.3  


 


Absolute Eos (auto)  0.0  


 


Absolute Basos (auto)  0.0  


 


Seg Neutrophils %  85.0 H  


 


Sodium   134.4 L 


 


Potassium   3.3 L 


 


Chloride   98 


 


Carbon Dioxide   29 


 


Anion Gap   7 


 


BUN   11 


 


Creatinine   0.46 L 


 


Est GFR ( Amer)   > 60 


 


Est GFR (MDRD) Non-Af   > 60 


 


Glucose   118 H 


 


Calcium   9.6 


 


Magnesium   


 


Total Bilirubin   0.7 


 


Direct Bilirubin   0.0 


 


Neonat Total Bilirubin   Not Reportable 


 


Neonat Direct Bilirubin   Not Reportable 


 


Neonat Indirect Bili   Not Reportable 


 


AST   151 H 


 


ALT   76 H 


 


Alkaline Phosphatase   123 


 


Creatine Kinase   159 H 


 


CK-MB (CK-2)    2.09


 


Troponin I    < 0.012


 


Total Protein   8.2 


 


Albumin   4.6 


 


Lipase   


 


Serum HCG, Qual   


 


Urine Color   


 


Urine Appearance   


 


Urine pH   


 


Ur Specific Gravity   


 


Urine Protein   


 


Urine Glucose (UA)   


 


Urine Ketones   


 


Urine Blood   


 


Urine Nitrite   


 


Urine Bilirubin   


 


Urine Urobilinogen   


 


Ur Leukocyte Esterase   


 


Urine WBC (Auto)   


 


Urine RBC (Auto)   


 


U Hyaline Cast (Auto)   


 


Squamous Epi Cells Auto   


 


Urine Mucus (Auto)   


 


Urine Ascorbic Acid   














  06/23/20 06/23/20 06/23/20





  09:39 09:39 09:39


 


WBC   


 


RBC   


 


Hgb   


 


Hct   


 


MCV   


 


MCH   


 


MCHC   


 


RDW   


 


Plt Count   


 


Lymph % (Auto)   


 


Mono % (Auto)   


 


Eos % (Auto)   


 


Baso % (Auto)   


 


Absolute Neuts (auto)   


 


Absolute Lymphs (auto)   


 


Absolute Monos (auto)   


 


Absolute Eos (auto)   


 


Absolute Basos (auto)   


 


Seg Neutrophils %   


 


Sodium   


 


Potassium   


 


Chloride   


 


Carbon Dioxide   


 


Anion Gap   


 


BUN   


 


Creatinine   


 


Est GFR ( Amer)   


 


Est GFR (MDRD) Non-Af   


 


Glucose   


 


Calcium   


 


Magnesium    1.4 L


 


Total Bilirubin   


 


Direct Bilirubin   


 


Neonat Total Bilirubin   


 


Neonat Direct Bilirubin   


 


Neonat Indirect Bili   


 


AST   


 


ALT   


 


Alkaline Phosphatase   


 


Creatine Kinase   


 


CK-MB (CK-2)   


 


Troponin I   


 


Total Protein   


 


Albumin   


 


Lipase    208.4


 


Serum HCG, Qual   NEGATIVE 


 


Urine Color  YELLOW  


 


Urine Appearance  CLEAR  


 


Urine pH  5.0  


 


Ur Specific Gravity  1.013  


 


Urine Protein  NEGATIVE  


 


Urine Glucose (UA)  NEGATIVE  


 


Urine Ketones  NEGATIVE  


 


Urine Blood  SMALL H  


 


Urine Nitrite  NEGATIVE  


 


Urine Bilirubin  NEGATIVE  


 


Urine Urobilinogen  NEGATIVE  


 


Ur Leukocyte Esterase  NEGATIVE  


 


Urine WBC (Auto)  1  


 


Urine RBC (Auto)  0  


 


U Hyaline Cast (Auto)  3  


 


Squamous Epi Cells Auto  2  


 


Urine Mucus (Auto)  RARE  


 


Urine Ascorbic Acid  NEGATIVE  














  06/23/20





  12:35


 


WBC 


 


RBC 


 


Hgb 


 


Hct 


 


MCV 


 


MCH 


 


MCHC 


 


RDW 


 


Plt Count 


 


Lymph % (Auto) 


 


Mono % (Auto) 


 


Eos % (Auto) 


 


Baso % (Auto) 


 


Absolute Neuts (auto) 


 


Absolute Lymphs (auto) 


 


Absolute Monos (auto) 


 


Absolute Eos (auto) 


 


Absolute Basos (auto) 


 


Seg Neutrophils % 


 


Sodium 


 


Potassium 


 


Chloride 


 


Carbon Dioxide 


 


Anion Gap 


 


BUN 


 


Creatinine 


 


Est GFR ( Amer) 


 


Est GFR (MDRD) Non-Af 


 


Glucose 


 


Calcium 


 


Magnesium 


 


Total Bilirubin 


 


Direct Bilirubin 


 


Neonat Total Bilirubin 


 


Neonat Direct Bilirubin 


 


Neonat Indirect Bili 


 


AST 


 


ALT 


 


Alkaline Phosphatase 


 


Creatine Kinase 


 


CK-MB (CK-2) 


 


Troponin I  < 0.012


 


Total Protein 


 


Albumin 


 


Lipase 


 


Serum HCG, Qual 


 


Urine Color 


 


Urine Appearance 


 


Urine pH 


 


Ur Specific Gravity 


 


Urine Protein 


 


Urine Glucose (UA) 


 


Urine Ketones 


 


Urine Blood 


 


Urine Nitrite 


 


Urine Bilirubin 


 


Urine Urobilinogen 


 


Ur Leukocyte Esterase 


 


Urine WBC (Auto) 


 


Urine RBC (Auto) 


 


U Hyaline Cast (Auto) 


 


Squamous Epi Cells Auto 


 


Urine Mucus (Auto) 


 


Urine Ascorbic Acid 














- Diagnostic Test


Radiology reviewed: Reports reviewed





Discharge





- Discharge


Clinical Impression: 


 Vertigo





Nausea and vomiting


Qualifiers:


 Vomiting type: unspecified Vomiting Intractability: non-intractable Qualified 

Code(s): R11.2 - Nausea with vomiting, unspecified





Gastritis


Qualifiers:


 Gastritis type: unspecified gastritis Chronicity: acute Gastritis bleeding: 

without bleeding Qualified Code(s): K29.00 - Acute gastritis without bleeding





Condition: Stable


Disposition: HOME, SELF-CARE


Instructions:  Antinausea Medication ([facility]), Gastritis ([facility]), Intravenous (IV) 

Fluids ([facility]), Meclizine ([facility]), Vertigo ([facility]), Vomiting ([facility])


Additional Instructions: 


Return immediately for any new or worsening symptoms





Followup with your primary care provider, call tomorrow to make a followup 

appointment





Increase foods in diet rich in potassium








Prescriptions: 


Meclizine HCl [Antivert 25 mg Tablet] 25 mg PO ASDIR PRN #12 tablet


 PRN Reason: 


Famotidine [Pepcid 20 mg Tablet] 20 mg PO BID #12 tablet


Ondansetron [Zofran Odt 4 mg Tablet] 1 tab PO Q6H #15 tab.rapdis


Forms:  Return to Work


Referrals: 


[facility] [Provider Group] - Follow up as needed

## 2020-07-23 ENCOUNTER — HOSPITAL ENCOUNTER (EMERGENCY)
Dept: HOSPITAL 62 - ER | Age: 40
Discharge: HOME | End: 2020-07-23
Payer: SELF-PAY

## 2020-07-23 VITALS — DIASTOLIC BLOOD PRESSURE: 83 MMHG | SYSTOLIC BLOOD PRESSURE: 130 MMHG

## 2020-07-23 DIAGNOSIS — E86.0: ICD-10-CM

## 2020-07-23 DIAGNOSIS — R11.0: ICD-10-CM

## 2020-07-23 DIAGNOSIS — F17.200: ICD-10-CM

## 2020-07-23 DIAGNOSIS — I48.91: ICD-10-CM

## 2020-07-23 DIAGNOSIS — R25.2: Primary | ICD-10-CM

## 2020-07-23 DIAGNOSIS — Z86.73: ICD-10-CM

## 2020-07-23 DIAGNOSIS — M79.10: ICD-10-CM

## 2020-07-23 DIAGNOSIS — I11.0: ICD-10-CM

## 2020-07-23 DIAGNOSIS — I50.9: ICD-10-CM

## 2020-07-23 LAB
ADD MANUAL DIFF: NO
ANION GAP SERPL CALC-SCNC: 6 MMOL/L (ref 5–19)
BASOPHILS # BLD AUTO: 0 10^3/UL (ref 0–0.2)
BASOPHILS NFR BLD AUTO: 0.4 % (ref 0–2)
BUN SERPL-MCNC: < 2 MG/DL (ref 7–20)
CALCIUM: 8.8 MG/DL (ref 8.4–10.2)
CHLORIDE SERPL-SCNC: 101 MMOL/L (ref 98–107)
CO2 SERPL-SCNC: 25 MMOL/L (ref 22–30)
EOSINOPHIL # BLD AUTO: 0 10^3/UL (ref 0–0.6)
EOSINOPHIL NFR BLD AUTO: 0.3 % (ref 0–6)
ERYTHROCYTE [DISTWIDTH] IN BLOOD BY AUTOMATED COUNT: 14.4 % (ref 11.5–14)
GLUCOSE SERPL-MCNC: 131 MG/DL (ref 75–110)
HCT VFR BLD CALC: 36.6 % (ref 36–47)
HGB BLD-MCNC: 12.6 G/DL (ref 12–15.5)
LYMPHOCYTES # BLD AUTO: 1 10^3/UL (ref 0.5–4.7)
LYMPHOCYTES NFR BLD AUTO: 14.8 % (ref 13–45)
MCH RBC QN AUTO: 33.4 PG (ref 27–33.4)
MCHC RBC AUTO-ENTMCNC: 34.5 G/DL (ref 32–36)
MCV RBC AUTO: 97 FL (ref 80–97)
MONOCYTES # BLD AUTO: 0.4 10^3/UL (ref 0.1–1.4)
MONOCYTES NFR BLD AUTO: 5.3 % (ref 3–13)
NEUTROPHILS # BLD AUTO: 5.5 10^3/UL (ref 1.7–8.2)
NEUTS SEG NFR BLD AUTO: 79.2 % (ref 42–78)
PLATELET # BLD: 182 10^3/UL (ref 150–450)
POTASSIUM SERPL-SCNC: 3.5 MMOL/L (ref 3.6–5)
RBC # BLD AUTO: 3.79 10^6/UL (ref 3.72–5.28)
TOTAL CELLS COUNTED % (AUTO): 100 %
WBC # BLD AUTO: 6.9 10^3/UL (ref 4–10.5)

## 2020-07-23 PROCEDURE — 85025 COMPLETE CBC W/AUTO DIFF WBC: CPT

## 2020-07-23 PROCEDURE — 93005 ELECTROCARDIOGRAM TRACING: CPT

## 2020-07-23 PROCEDURE — 36415 COLL VENOUS BLD VENIPUNCTURE: CPT

## 2020-07-23 PROCEDURE — 99283 EMERGENCY DEPT VISIT LOW MDM: CPT

## 2020-07-23 PROCEDURE — 80048 BASIC METABOLIC PNL TOTAL CA: CPT

## 2020-07-23 PROCEDURE — 83735 ASSAY OF MAGNESIUM: CPT

## 2020-07-23 PROCEDURE — 93010 ELECTROCARDIOGRAM REPORT: CPT

## 2020-07-23 NOTE — ER DOCUMENT REPORT
ED General





- General


Chief Complaint: Pain All Over


Stated Complaint: PAIN ALL OVER/DEHYDRATION


Time Seen by Provider: 07/23/20 08:28


Primary Care Provider: 


TO GARCÍA MD [Primary Care Provider] - Follow up as needed


Notes: 





Patient presents with weakness and leg cramping onset this morning at 4 AM when 

she woke up to take her medicines and she stretched.  She takes Lipitor 

potassium and a diuretic but she does not remember.  She is felt nauseous for 2 

days has not been drinking much and the air conditioner is broken at work.  She 

has dark urine.  No chest pain or shortness of breath.


TRAVEL OUTSIDE OF THE [place] IN LAST 30 DAYS: No





- Related Data


Allergies/Adverse Reactions: 


                                        





Sulfa (Sulfonamide Antibiotics) Adverse Reaction (Verified 07/23/20 08:24)


   











Past Medical History





- General


Information source: Patient





- Social History


Smoking Status: Current Some Day Smoker


Chew tobacco use (# tins/day): No


Frequency of alcohol use: None


Drug Abuse: None


Family History: Reviewed & Not Pertinent, CAD, Malignancy





- Past Medical History


Cardiac Medical History: Reports: Hx Atrial Fibrillation - currently wearing 

heart monitor 6/2020, Hx Congestive Heart Failure, Hx Hypercholesterolemia, Hx 

Hypertension - Untreated currently


Neurological Medical History: Reports: Hx Cerebrovascular Accident


Renal/ Medical History: Denies: Hx Peritoneal Dialysis





- Immunizations


Hx Diphtheria, Pertussis, Tetanus Vaccination: Yes





Review of Systems





- Review of Systems


Notes: 





REVIEW OF SYSTEMS





GEN: Denies


ENT: Denies sore throat, nasal discharge, ear pain


EYES: Denies blurry vision, eye pain, discharge


CV: Denies chest pain, palpitations, edema


RESP: Denies cough, shortness of breath, wheezing


GI: Denies abdominal pain, nausea, vomiting, diarrhea


MSK: Cramps


SKIN: Denies rash, skin lesions


LYMPH: Denies swollen glands/lymph nodes


NEURO: Denies headache, focal weakness or numbness, dizziness


PSYCH: Denies depression, suicidal or homicidal ideation








PHYSICAL EXAMINATION





General: No acute distress, well-nourished


Head: Atraumatic, normocephalic


ENT: Mouth normal, oropharynx moist, no exudates or tonsillar enlargement


Eyes: Conjunctiva normal, pupils equal, lids normal


Neck: No JVD, supple, no guarding


CVS: Normal rate, regular rhythm, no murmurs


Resp: No resp distress, equal and normal breath sounds bilaterally


GI: Nondistended, soft, no tenderness to palpation, no rebound or guarding


Ext: No deformities, no edema, normal range of motion in upper and lower ext


Back: No CVA or midline TTP


Skin: No rash, warm


Lymphatic: No lymphadeopathy noted


Neuro: Awake, alert.  Face symmetric.  GCS 15.





Physical Exam





- Vital signs


Vitals: 


                                        











Temp Pulse Resp BP Pulse Ox


 


 99.0 F   101 H  19   105/67   100 


 


 07/23/20 07:58  07/23/20 07:58  07/23/20 07:58  07/23/20 07:58  07/23/20 07:58














Course





- Re-evaluation


Re-evalutation: 





07/23/20 15:15


Presents leg cramping on the setting of potassium use and diuretic use also not 

drinking too much he seems dehydrated


She is given fluids.  She had labs done which showed a very mild hypokalemia.  

This was repleted orally.  EKG stable


She is stable for discharge home to resume all meds and keep hydrated localized 

resolved cramping argues against rhabdo


I have discussed with the patient there likely diagnosis, aftercare plan, 

follow-up plans and my usual and customary return precautions.  They verbalized 

understanding of this.





- Vital Signs


Vital signs: 


                                        











Temp Pulse Resp BP Pulse Ox


 


 98.4 F   96   16   130/83 H  99 


 


 07/23/20 11:09  07/23/20 11:08  07/23/20 11:08  07/23/20 11:08  07/23/20 11:08














- Laboratory


Result Diagrams: 


                                 07/23/20 09:39





                                 07/23/20 09:39


Laboratory results interpreted by me: 


                                        











  07/23/20 07/23/20





  09:39 09:39


 


RDW  14.4 H 


 


Seg Neutrophils %  79.2 H 


 


Sodium   131.7 L


 


Potassium   3.5 L


 


BUN   < 2 L


 


Creatinine   0.50 L


 


Glucose   131 H


 


Magnesium   1.5 L














- EKG Interpretation by Me


EKG shows normal: Sinus rhythm


Rate: Normal


Rhythm: NSR





Discharge





- Discharge


Clinical Impression: 


 Leg cramps





Condition: Good


Disposition: HOME, SELF-CARE


Instructions:  Leg Cramps ([facility])


Additional Instructions: 


Follow-up with your regular doctor for repeat potassium check in 3-5 days.


Forms:  Return to Work


Referrals: 


TO GARCÍA MD [Primary Care Provider] - Follow up as needed

## 2020-07-24 NOTE — EKG REPORT
SEVERITY:- ABNORMAL ECG -

SINUS RHYTHM

FIRST DEGREE AV BLOCK

:

Confirmed by: Heidy Norman 24-Jul-2020 01:15:14

## 2020-11-27 ENCOUNTER — HOSPITAL ENCOUNTER (EMERGENCY)
Dept: HOSPITAL 62 - ER | Age: 40
Discharge: HOME | End: 2020-11-27
Payer: SELF-PAY

## 2020-11-27 VITALS — DIASTOLIC BLOOD PRESSURE: 107 MMHG | SYSTOLIC BLOOD PRESSURE: 158 MMHG

## 2020-11-27 DIAGNOSIS — I45.10: ICD-10-CM

## 2020-11-27 DIAGNOSIS — I10: ICD-10-CM

## 2020-11-27 DIAGNOSIS — R19.7: ICD-10-CM

## 2020-11-27 DIAGNOSIS — R51.9: ICD-10-CM

## 2020-11-27 DIAGNOSIS — Z20.828: ICD-10-CM

## 2020-11-27 DIAGNOSIS — R10.10: ICD-10-CM

## 2020-11-27 DIAGNOSIS — E78.00: ICD-10-CM

## 2020-11-27 DIAGNOSIS — R42: ICD-10-CM

## 2020-11-27 DIAGNOSIS — R11.2: Primary | ICD-10-CM

## 2020-11-27 DIAGNOSIS — F17.200: ICD-10-CM

## 2020-11-27 DIAGNOSIS — Z32.01: ICD-10-CM

## 2020-11-27 LAB
A TYPE INFLUENZA AG: NEGATIVE
ADD MANUAL DIFF: NO
ALBUMIN SERPL-MCNC: 4.6 G/DL (ref 3.5–5)
ALP SERPL-CCNC: 88 U/L (ref 38–126)
ANION GAP SERPL CALC-SCNC: 9 MMOL/L (ref 5–19)
APPEARANCE UR: (no result)
APTT PPP: (no result) S
AST SERPL-CCNC: 45 U/L (ref 14–36)
B INFLUENZA AG: NEGATIVE
BASOPHILS # BLD AUTO: 0 10^3/UL (ref 0–0.2)
BASOPHILS NFR BLD AUTO: 0.3 % (ref 0–2)
BILIRUB DIRECT SERPL-MCNC: 0.2 MG/DL (ref 0–0.4)
BILIRUB SERPL-MCNC: 1 MG/DL (ref 0.2–1.3)
BILIRUB UR QL STRIP: NEGATIVE
BUN SERPL-MCNC: 6 MG/DL (ref 7–20)
CALCIUM: 9.1 MG/DL (ref 8.4–10.2)
CHLORIDE SERPL-SCNC: 97 MMOL/L (ref 98–107)
CO2 SERPL-SCNC: 31 MMOL/L (ref 22–30)
EOSINOPHIL # BLD AUTO: 0 10^3/UL (ref 0–0.6)
EOSINOPHIL NFR BLD AUTO: 0.6 % (ref 0–6)
ERYTHROCYTE [DISTWIDTH] IN BLOOD BY AUTOMATED COUNT: 21.4 % (ref 11.5–14)
GLUCOSE SERPL-MCNC: 149 MG/DL (ref 75–110)
GLUCOSE UR STRIP-MCNC: NEGATIVE MG/DL
HCT VFR BLD CALC: 35.4 % (ref 36–47)
HGB BLD-MCNC: 12.1 G/DL (ref 12–15.5)
KETONES UR STRIP-MCNC: NEGATIVE MG/DL
LYMPHOCYTES # BLD AUTO: 1.1 10^3/UL (ref 0.5–4.7)
LYMPHOCYTES NFR BLD AUTO: 17.7 % (ref 13–45)
MCH RBC QN AUTO: 35.3 PG (ref 27–33.4)
MCHC RBC AUTO-ENTMCNC: 34.2 G/DL (ref 32–36)
MCV RBC AUTO: 103 FL (ref 80–97)
MONOCYTES # BLD AUTO: 0.3 10^3/UL (ref 0.1–1.4)
MONOCYTES NFR BLD AUTO: 4.3 % (ref 3–13)
NEUTROPHILS # BLD AUTO: 4.9 10^3/UL (ref 1.7–8.2)
NEUTS SEG NFR BLD AUTO: 77.1 % (ref 42–78)
NITRITE UR QL STRIP: NEGATIVE
PH UR STRIP: 5 [PH] (ref 5–9)
PHOSPHATE SERPL-MCNC: 3.8 MG/DL (ref 2.5–4.5)
PLATELET # BLD: 175 10^3/UL (ref 150–450)
POTASSIUM SERPL-SCNC: 3.3 MMOL/L (ref 3.6–5)
PROT SERPL-MCNC: 8.7 G/DL (ref 6.3–8.2)
PROT UR STRIP-MCNC: 100 MG/DL
RBC # BLD AUTO: 3.43 10^6/UL (ref 3.72–5.28)
SP GR UR STRIP: 1.03
TOTAL CELLS COUNTED % (AUTO): 100 %
UROBILINOGEN UR-MCNC: NEGATIVE MG/DL (ref ?–2)
WBC # BLD AUTO: 6.4 10^3/UL (ref 4–10.5)

## 2020-11-27 PROCEDURE — 76817 TRANSVAGINAL US OBSTETRIC: CPT

## 2020-11-27 PROCEDURE — 87635 SARS-COV-2 COVID-19 AMP PRB: CPT

## 2020-11-27 PROCEDURE — 96360 HYDRATION IV INFUSION INIT: CPT

## 2020-11-27 PROCEDURE — 80053 COMPREHEN METABOLIC PANEL: CPT

## 2020-11-27 PROCEDURE — 84100 ASSAY OF PHOSPHORUS: CPT

## 2020-11-27 PROCEDURE — 87804 INFLUENZA ASSAY W/OPTIC: CPT

## 2020-11-27 PROCEDURE — 87070 CULTURE OTHR SPECIMN AEROBIC: CPT

## 2020-11-27 PROCEDURE — 84703 CHORIONIC GONADOTROPIN ASSAY: CPT

## 2020-11-27 PROCEDURE — 84484 ASSAY OF TROPONIN QUANT: CPT

## 2020-11-27 PROCEDURE — 87880 STREP A ASSAY W/OPTIC: CPT

## 2020-11-27 PROCEDURE — C9803 HOPD COVID-19 SPEC COLLECT: HCPCS

## 2020-11-27 PROCEDURE — 93005 ELECTROCARDIOGRAM TRACING: CPT

## 2020-11-27 PROCEDURE — 36415 COLL VENOUS BLD VENIPUNCTURE: CPT

## 2020-11-27 PROCEDURE — 93010 ELECTROCARDIOGRAM REPORT: CPT

## 2020-11-27 PROCEDURE — S0119 ONDANSETRON 4 MG: HCPCS

## 2020-11-27 PROCEDURE — 83735 ASSAY OF MAGNESIUM: CPT

## 2020-11-27 PROCEDURE — 81001 URINALYSIS AUTO W/SCOPE: CPT

## 2020-11-27 PROCEDURE — 99285 EMERGENCY DEPT VISIT HI MDM: CPT

## 2020-11-27 PROCEDURE — 71045 X-RAY EXAM CHEST 1 VIEW: CPT

## 2020-11-27 PROCEDURE — 81025 URINE PREGNANCY TEST: CPT

## 2020-11-27 PROCEDURE — 93976 VASCULAR STUDY: CPT

## 2020-11-27 PROCEDURE — 85025 COMPLETE CBC W/AUTO DIFF WBC: CPT

## 2020-11-27 PROCEDURE — 87086 URINE CULTURE/COLONY COUNT: CPT

## 2020-11-27 PROCEDURE — 84702 CHORIONIC GONADOTROPIN TEST: CPT

## 2020-11-27 RX ADMIN — SODIUM CHLORIDE PRN MLS/HR: 9 INJECTION, SOLUTION INTRAVENOUS at 10:46

## 2020-11-27 RX ADMIN — SODIUM CHLORIDE PRN MLS/HR: 9 INJECTION, SOLUTION INTRAVENOUS at 13:50

## 2020-11-27 NOTE — RADIOLOGY REPORT (SQ)
EXAM DESCRIPTION:  CHEST SINGLE VIEW



IMAGES COMPLETED DATE/TIME:  11/27/2020 10:49 am



REASON FOR STUDY:  nausea vomiting weak



COMPARISON:  6/23/2020



EXAM PARAMETERS:  NUMBER OF VIEWS: One view.

TECHNIQUE: Single frontal radiographic view of the chest acquired.

RADIATION DOSE: NA

LIMITATIONS: None.



FINDINGS:  LUNGS AND PLEURA: No opacities, masses or pneumothorax. No pleural effusion.

MEDIASTINUM AND HILAR STRUCTURES: No masses.  Contour normal.

HEART AND VASCULAR STRUCTURES: Heart normal in size.  Normal vasculature.

BONES: No acute findings.

HARDWARE: None in the chest.

OTHER: No other significant finding.



IMPRESSION:  NO ACUTE RADIOGRAPHIC FINDING IN THE CHEST.



TECHNICAL DOCUMENTATION:  JOB ID:  [id number]

 2011 [employer]- All Rights Reserved



Reading location - IP/workstation name: [email or web address]

## 2020-11-27 NOTE — EKG REPORT
SEVERITY:- ABNORMAL ECG -

SINUS RHYTHM

FIRST DEGREE AV BLOCK

LEFT VENTRICULAR HYPERTROPHY

ANTERIOR Q WAVES, POSSIBLY DUE TO LVH

:

Confirmed by: Ricardo Webber MD 27-Nov-2020 11:17:08

## 2020-11-27 NOTE — ER DOCUMENT REPORT
ED Medical Screen (RME)





- General


Chief Complaint: Vomiting/Diarrhea


Stated Complaint: VOMITING,DIARRHEA


Time Seen by Provider: 11/27/20 10:12


Primary Care Provider: 


TO GARCÍA MD [Primary Care Provider] - Follow up as needed


Mode of Arrival: Wheelchair


Information source: Patient


Notes: 





40-year-old female presented to ED for complaint of nausea vomiting hands and 

feet feeling no weakness.  She took her blood pressure this morning it was 80s 

over 50s we took it here and is 83/51.  She states she is afraid that her 

potassium is down because she cannot keep down all the foods that she is 

supposed to eat for her potassium.  States she has not been able to keep down 

her amlodipine or her Lipitor either.  She states she does smoke 5 cigarettes a 

day she does have high blood pressure high cholesterol and she had a stroke in 

her 20s.  Patient is alert oriented respirations regular and unlabored answering

questions appropriately at this time.  We will get her in her room and get her 

seen by a provider.


TRAVEL OUTSIDE OF THE [place] IN LAST 30 DAYS: No





- Related Data


Allergies/Adverse Reactions: 


                                        





Sulfa (Sulfonamide Antibiotics) Adverse Reaction (Verified 07/23/20 08:24)


   








Home Medications: bp amlodipine.  potassium.  lipitor





Past Medical History





- Social History


Chew tobacco use (# tins/day): No


Frequency of alcohol use: None


Drug Abuse: None





- Past Medical History


Cardiac Medical History: Reports: Hx Atrial Fibrillation - currently wearing 

heart monitor 6/2020, Hx Congestive Heart Failure, Hx Hypercholesterolemia, Hx 

Hypertension - Untreated currently


Neurological Medical History: Reports: Hx Cerebrovascular Accident


Renal/ Medical History: Denies: Hx Peritoneal Dialysis





- Immunizations


Hx Diphtheria, Pertussis, Tetanus Vaccination: Yes





Physical Exam





- Vital signs


Vitals: 





                                        











Temp Pulse Resp BP Pulse Ox


 


 97.9 F   98   16   83/51 L  100 


 


 11/27/20 10:04  11/27/20 10:04  11/27/20 10:04  11/27/20 10:04  11/27/20 10:04














Course





- Vital Signs


Vital signs: 





                                        











Temp Pulse Resp BP Pulse Ox


 


 97.9 F   98   16   83/51 L  100 


 


 11/27/20 10:12  11/27/20 10:04  11/27/20 10:04  11/27/20 10:04  11/27/20 10:04














Doctor's Discharge





- Discharge


Referrals: 


TO GARCÍA MD [Primary Care Provider] - Follow up as needed

## 2020-11-27 NOTE — ER DOCUMENT REPORT
ED General





- General


Chief Complaint: Nausea/Vomiting


Stated Complaint: VOMITING,DIARRHEA


Time Seen by Provider: 11/27/20 10:12


Primary Care Provider: 


TO GARCÍA MD [Primary Care Provider] - Follow up as needed


Mode of Arrival: Wheelchair


TRAVEL OUTSIDE OF THE [place] IN LAST 30 DAYS: No





- HPI


Notes: 





Patient is a 40-year-old female with a past medical history of hypertension and 

previous stroke without residual deficits presents with nausea and vomiting.  

Patient states she did not feel well yesterday while cooking.  She did not have 

any dinner or food yesterday.  She states she began having vomiting.  Vomiting 

was clear liquid.  There was no bile.  She had slight diarrhea without any blood

in it.  Patient states she has pain in her upper abdomen.  It feels muscular.  

She denies any fevers or chills.  No Covid exposure.  She has a headache and is 

slightly lightheaded.  She has not taken any of her medicines or her potassium 

pills.  She states the last time she ate was the day before yesterday





- Related Data


Allergies/Adverse Reactions: 


                                        





Sulfa (Sulfonamide Antibiotics) Adverse Reaction (Verified 11/27/20 10:17)


   








Home Medications: bp amlodipine.  potassium.  lipitor





Past Medical History





- General


Information source: Patient





- Social History


Smoking Status: Current Every Day Smoker


Chew tobacco use (# tins/day): No


Frequency of alcohol use: None


Drug Abuse: None


Family History: Reviewed & Not Pertinent, CAD, Malignancy


Patient has homicidal ideation: No





- Past Medical History


Cardiac Medical History: Reports: Hx Atrial Fibrillation - currently wearing 

heart monitor 6/2020, Hx Congestive Heart Failure, Hx Hypercholesterolemia, Hx 

Hypertension - Untreated currently


Neurological Medical History: Reports: Hx Cerebrovascular Accident


Renal/ Medical History: Denies: Hx Peritoneal Dialysis





- Immunizations


Hx Diphtheria, Pertussis, Tetanus Vaccination: Yes





Review of Systems





- Review of Systems


Notes: 


CONSTITUTIONAL:  No fever, fatigue or weight loss.  


SKIN:  No rash.  


HENT:  No congestion, ear pain, or sore throat.   


CARDIOVASCULAR:  No chest pain or edema.


RESPIRATORY:  No cough, shortness of breath, congestion, or wheezing.  


GASTROINTESTINAL: Positive for abdominal pain, nausea, vomiting, diarrhea.  


GENITOURINARY: No dysuria.  


MUSCULOSKELETAL:  No joint pain or swelling.  


NEUROLOGIC:  No seizures. No headache, focal weakness or sensory changes. 


HEMATOLOGIC:  No unusual bruising or bleeding.  


PSYCHIATRIC:  No depression or anxiety.





Physical Exam





- Vital signs


Vitals: 


                                        











Temp Pulse Resp BP Pulse Ox


 


 97.9 F   98   16   83/51 L  100 


 


 11/27/20 10:04  11/27/20 10:04  11/27/20 10:04  11/27/20 10:04  11/27/20 10:04














- General


General appearance: Appears well


Notes: 





VITAL SIGNS: Within normal limits.


GENERAL:  No acute distress, non-toxic appearance.  


HEAD:  Normal with no signs of head trauma.


EYES:  EOMI, conjunctiva normal, no discharge.  


EARS:  Hearing grossly intact.


NOSE: Normal.


NECK:  Normal range of motion, no tenderness   


CHEST:  Clear breath sounds bilaterally.  No wheezes, rales, or rhonchi.  


CARDIAC:  Regular rate and rhythm.  S1 and S2, without murmurs, gallops, or 

rubs.


VASCULAR:  No Edema.  


ABDOMEN:  Minmal discomfort to palpation of upper abdomen.


GASTROINTESTINAL: Bowel sounds normal


GENITOURINARY: Normal, No tenderness


MUSCULOSKELETAL:  Good range of motion of all major joints. Extremities without 

clubbing, cyanosis or edema.  


NEUROLOGICAL:  Alert and oriented x 3.  No focal sensory or strength deficits.  

Speech normal.  Follows commands appropriately.


PSYCHIATRIC:  Normal Affect, judgement and mood.


SKIN:  Normal appearance with no rashes or lesions.





Course





- Re-evaluation


Re-evalutation: 





11/27/20 11:27


Patient was initially hypotensive in triage.  Her blood pressure improved 

without fluids.  She will receive fluid boluses and Zofran.  Lab work will be ob

tained.





- Vital Signs


Vital signs: 


                                        











Temp Pulse Resp BP Pulse Ox


 


 97.9 F   98   19   142/97 H  100 


 


 11/27/20 10:12  11/27/20 10:04  11/27/20 16:02  11/27/20 16:02  11/27/20 16:02














- Laboratory


Result Diagrams: 


                                 11/27/20 10:50





                                 11/27/20 10:50


Laboratory results interpreted by me: 


                                        











  11/27/20 11/27/20 11/27/20





  10:50 10:50 10:50


 


RBC  3.43 L  


 


Hct  35.4 L  


 


MCV  103 H  


 


MCH  35.3 H  


 


RDW  21.4 H  


 


Potassium   3.3 L 


 


Chloride   97 L 


 


Carbon Dioxide   31 H 


 


BUN   6 L 


 


Glucose   149 H 


 


Magnesium   1.3 L 


 


AST   45 H 


 


Total Protein   8.7 H 


 


Serum HCG, Qual    POSITIVE H


 


Urine Protein   


 


Urine HCG, Qual   














  11/27/20 11/27/20





  11:40 11:40


 


RBC  


 


Hct  


 


MCV  


 


MCH  


 


RDW  


 


Potassium  


 


Chloride  


 


Carbon Dioxide  


 


BUN  


 


Glucose  


 


Magnesium  


 


AST  


 


Total Protein  


 


Serum HCG, Qual  


 


Urine Protein   100 H


 


Urine HCG, Qual  POSITIVE H 














- EKG Interpretation by Me


EKG shows normal: Sinus rhythm


Rate: Normal


Heart block present: 1st Degree


When compared to previous EKG there are: No significant change


Additional EKG results interpreted by me: 





11/27/20 11:29


Sinus rhythm at a rate of 73.  First-degree AV block.  QTc 476.  No significant 

change from previous.





Discharge





- Discharge


Clinical Impression: 


 Vomiting and diarrhea, Positive pregnancy test





Condition: Stable


Disposition: HOME, SELF-CARE


Instructions:  COVID-19 Guidance for Persons Under Investigation, Vomiting ([facility])


Additional Instructions: 


Your work-up today is reassuring.  Please make sure you are staying hydrated.  

Please follow-up with OB/GYN and your family doctor.  You will need to have your

beta hCG levels rechecked.  Please return to the ER for any fever, abdominal 

pain, vaginal bleeding, any other concerning symptoms.  You were tested for 

COVID-19.  Please self isolate until you are called with a negative result.


Prescriptions: 


Metoclopramide HCl [Reglan] 5 mg PO Q6H PRN #10 tablet


 PRN Reason: 


Referrals: 


TO GARCÍA MD [Primary Care Provider] - Follow up as needed


RANDY CLARKE MD [ACTIVE STAFF] - Follow up in 3-5 days

## 2020-11-27 NOTE — RADIOLOGY REPORT (SQ)
EXAM DESCRIPTION:  U/S OB TRANSVAG W/DOPPLER



IMAGES COMPLETED DATE/TIME:  2020 5:23 pm



REASON FOR STUDY:  pregnancy test positive, vomiting



COMPARISON:  None.



TECHNIQUE:  Transvaginal static and realtime grayscale images acquired of the pelvis. Additional salinas
cted spectral and color Doppler images recorded. All images stored on PACs.

CLINICAL AGE: Unknown

BHC.[id number]



LIMITATIONS:  None.



FINDINGS:  UTERUS: No visualized intrauterine pregnancy.

RIGHT ADNEXA: Normal ovary with normal vascular flow.

No adnexal free fluid.

No adnexal masses.

LEFT ADNEXA: Ovary not identified due to poor acoustical window.

No adnexal free fluid.

No adnexal masses.

FREE FLUID: None.

OTHER: No other significant finding.



IMPRESSION:  NO VISUALIZED INTRA- OR EXTRAUTERINE PREGNANCY.

bHCG LEVEL TOO LOW TO EXPECT VISUALIZATION OF PREGNANCY.

ECTOPIC PREGNANCY CANNOT BE EXCLUDED.

FOLLOW-UP ULTRASOUND AND SERIAL BHCG LEVELS STRONGLY RECOMMENDED TO ACCURATELY ASSESS PREGNANCY STATU
S.



TECHNICAL DOCUMENTATION:  JOB ID:  [id number]

  [employer]- All Rights Reserved



Reading location - IP/workstation name: [email or web address]

## 2021-01-03 ENCOUNTER — HOSPITAL ENCOUNTER (EMERGENCY)
Dept: HOSPITAL 62 - ER | Age: 41
LOS: 1 days | Discharge: HOME | End: 2021-01-04
Payer: SELF-PAY

## 2021-01-03 DIAGNOSIS — K59.00: Primary | ICD-10-CM

## 2021-01-03 DIAGNOSIS — F17.200: ICD-10-CM

## 2021-01-03 DIAGNOSIS — R10.814: ICD-10-CM

## 2021-01-03 DIAGNOSIS — I10: ICD-10-CM

## 2021-01-03 DIAGNOSIS — E78.00: ICD-10-CM

## 2021-01-03 DIAGNOSIS — Z88.2: ICD-10-CM

## 2021-01-03 DIAGNOSIS — Z79.899: ICD-10-CM

## 2021-01-03 DIAGNOSIS — R10.812: ICD-10-CM

## 2021-01-03 DIAGNOSIS — R11.2: ICD-10-CM

## 2021-01-03 LAB
ADD MANUAL DIFF: NO
ALBUMIN SERPL-MCNC: 4.5 G/DL (ref 3.5–5)
ALP SERPL-CCNC: 163 U/L (ref 38–126)
ANION GAP SERPL CALC-SCNC: 11 MMOL/L (ref 5–19)
APPEARANCE UR: CLEAR
APTT PPP: YELLOW S
AST SERPL-CCNC: 46 U/L (ref 14–36)
BASOPHILS # BLD AUTO: 0.1 10^3/UL (ref 0–0.2)
BASOPHILS NFR BLD AUTO: 0.6 % (ref 0–2)
BILIRUB DIRECT SERPL-MCNC: 0.4 MG/DL (ref 0–0.4)
BILIRUB SERPL-MCNC: 0.8 MG/DL (ref 0.2–1.3)
BILIRUB UR QL STRIP: NEGATIVE
BUN SERPL-MCNC: 8 MG/DL (ref 7–20)
CALCIUM: 10.1 MG/DL (ref 8.4–10.2)
CHLORIDE SERPL-SCNC: 100 MMOL/L (ref 98–107)
CO2 SERPL-SCNC: 26 MMOL/L (ref 22–30)
EOSINOPHIL # BLD AUTO: 0 10^3/UL (ref 0–0.6)
EOSINOPHIL NFR BLD AUTO: 0.4 % (ref 0–6)
ERYTHROCYTE [DISTWIDTH] IN BLOOD BY AUTOMATED COUNT: 17.1 % (ref 11.5–14)
GLUCOSE SERPL-MCNC: 102 MG/DL (ref 75–110)
GLUCOSE UR STRIP-MCNC: NEGATIVE MG/DL
HCT VFR BLD CALC: 35.6 % (ref 36–47)
HGB BLD-MCNC: 12.7 G/DL (ref 12–15.5)
KETONES UR STRIP-MCNC: 20 MG/DL
LYMPHOCYTES # BLD AUTO: 1.9 10^3/UL (ref 0.5–4.7)
LYMPHOCYTES NFR BLD AUTO: 22.3 % (ref 13–45)
MCH RBC QN AUTO: 35.5 PG (ref 27–33.4)
MCHC RBC AUTO-ENTMCNC: 35.7 G/DL (ref 32–36)
MCV RBC AUTO: 100 FL (ref 80–97)
MONOCYTES # BLD AUTO: 0.6 10^3/UL (ref 0.1–1.4)
MONOCYTES NFR BLD AUTO: 6.6 % (ref 3–13)
NEUTROPHILS # BLD AUTO: 6.1 10^3/UL (ref 1.7–8.2)
NEUTS SEG NFR BLD AUTO: 70.1 % (ref 42–78)
NITRITE UR QL STRIP: NEGATIVE
PH UR STRIP: 6 [PH] (ref 5–9)
PLATELET # BLD: 308 10^3/UL (ref 150–450)
POTASSIUM SERPL-SCNC: 3.2 MMOL/L (ref 3.6–5)
PROT SERPL-MCNC: 8.5 G/DL (ref 6.3–8.2)
PROT UR STRIP-MCNC: 30 MG/DL
RBC # BLD AUTO: 3.58 10^6/UL (ref 3.72–5.28)
SP GR UR STRIP: 1.01
TOTAL CELLS COUNTED % (AUTO): 100 %
UROBILINOGEN UR-MCNC: NEGATIVE MG/DL (ref ?–2)
WBC # BLD AUTO: 8.7 10^3/UL (ref 4–10.5)

## 2021-01-03 PROCEDURE — 36415 COLL VENOUS BLD VENIPUNCTURE: CPT

## 2021-01-03 PROCEDURE — 85025 COMPLETE CBC W/AUTO DIFF WBC: CPT

## 2021-01-03 PROCEDURE — S0119 ONDANSETRON 4 MG: HCPCS

## 2021-01-03 PROCEDURE — 74022 RADEX COMPL AQT ABD SERIES: CPT

## 2021-01-03 PROCEDURE — 83690 ASSAY OF LIPASE: CPT

## 2021-01-03 PROCEDURE — 80053 COMPREHEN METABOLIC PANEL: CPT

## 2021-01-03 PROCEDURE — 99284 EMERGENCY DEPT VISIT MOD MDM: CPT

## 2021-01-03 PROCEDURE — 81001 URINALYSIS AUTO W/SCOPE: CPT

## 2021-01-03 NOTE — ER DOCUMENT REPORT
ED General





- General


Chief Complaint: Constipation


Stated Complaint: CONSTIPATION


Time Seen by Provider: 21 15:06


Primary Care Provider: 


TO GARCÍA MD [Primary Care Provider] - Follow up as needed


TRAVEL OUTSIDE OF THE [place] IN LAST 30 DAYS: No





- HPI


Context: 











Chief Complaint: [Constipation]





[This is a 40-year-old female presenting to the emergency department complaining

 of constipation x3 days.  Patient states she is "usually pretty regular" but 

has not had a bowel movement in the past 3 days.  Patient is complaining of 

discomfort on the left upper and lower side of her abdomen.  Patient denies any 

history of Crohn's disease or ulcerative colitis.  Patient denies any 

significant change in her diet.        ]





History obtained from [patient]


Symptoms began:[3 days ago]


Onset: [Gradual]


Timing: [Gradual]


Quality: [Abdominal pressure]


Intensity: [4]





Location: [Abdomen]


Radiation: [Denies]


[The pain does not migrate to a new location.]





Aggravating factors: [none]


Relieving factors: [none]





[Denies] SOB


Positive nausea


Positive vomiting


[Denies] sweats


[Denies] fever


[Denies] cough


[Denies] calf or leg swelling or pain











- Related Data


Allergies/Adverse Reactions: 


                                        





Sulfa (Sulfonamide Antibiotics) Adverse Reaction (Verified 21 15:07)


   








Home Medications: htn.  lipitor.  potassium.  tylenol





Past Medical History





- General


Information source: Patient





- Social History


Smoking Status: Current Every Day Smoker


Chew tobacco use (# tins/day): No


Frequency of alcohol use: Occasional


Drug Abuse: None


Family History: Reviewed & Not Pertinent, CAD, Malignancy


Patient has homicidal ideation: No





- Past Medical History


Cardiac Medical History: Reports: Hx Atrial Fibrillation - currently wearing 

heart monitor 2020, Hx Congestive Heart Failure, Hx Hypercholesterolemia, Hx 

Hypertension - Untreated currently


Neurological Medical History: Reports: Hx Cerebrovascular Accident


Renal/ Medical History: Denies: Hx Peritoneal Dialysis





- Immunizations


Hx Diphtheria, Pertussis, Tetanus Vaccination: Yes





Review of Systems





- Review of Systems


Notes: 





Review of systems as below unless otherwise stated in HPI.


CONSTITUTIONAL 


[No] fever, [No] chills.


EYES 


[No] eye pain. 


ENT 


[No] URI symptoms, [No] sore throat, [No] ear pain.


CARDIOVASCULAR 


[No] chest pain, [No] palpitations, [No] edema.


RESPIRATORY 


[No] Cough, [No] SOB, [No] wheezing. 


GASTROINTESTINAL 


Positive abdominal pain, positive nausea, [No] Diarrhea, positive vomiting, 

positive constipation, [No] melena, [No] rectal bleeding.


GENITOURINARY 


[No] dysuria, [No] urinary frequency, [No] hematuria, [No] urinary urgency, [No]

 vaginal discharge, [No] vaginal bleeding.


MUSCULOSKELETAL 


[No] Back pain. 


SKIN 


[No] Rash.


NEUROLOGIC 


[No] Headache, [No] recent seizures, [No] paralysis,[No] parathesias. 


ENDOCRINE 


[No] polyuria. 


HEMO/LYMPATIC 


[No] easy brusing


PSYCHIATRIC 


[No] depression.














Physical Exam





- Vital signs


Vitals: 


                                        











Temp Pulse Resp BP Pulse Ox


 


 98.5 F   98   16   150/93 H  99 


 


 21 14:52  21 14:52  21 14:52  21 14:52  21 14:52














- Notes


Notes: 








CONSTITUTIONAL 


[Vital signs reviewed, Patient appears mildly uncomfortable, Alert and oriented 

X 3, Normal stature.]


HEAD 


[Atraumatic, Normocephalic.]


EYES 


[Eyes are normal to inspection, No discharge from eyes, Extraocular muscles 

intact, Sclera are normal, Conjunctiva are normal.]


ENT 


[External ears normal to inspection, Nose examination normal, Mouth normal to 

inspection.]


NECK 


[Normal ROM, No jugular venous distention, No meningeal signs, ]


RESPIRATORY CHEST 


[Chest is nontender, Breath sounds normal, No respiratory distress.]


CARDIOVASCULAR 


[RRR, No murmurs, Normal S1 S2, No rub, No gallop.]


ABDOMEN 


[Abdomen is mildly tender to palpation in the left upper and lower quadrants, No

 pulsatile masses, No other masses, Bowel sounds normal, slight distention 

distension, No peritoneal signs, No hernias.]


BACK 


[There is no CVA Tenderness, There is no tenderness to palpation, Normal 

inspection.]


UPPER EXTREMITY 


[Inspection normal, No cyanosis, No clubbing, No edema, 


LOWER EXTREMITY 


[Inspection normal, No cyanosis, No clubbing, No edema, No calf tenderness,


NEURO 


[No focal motor deficits, No focal sensory deficits, Speech normal.]


SKIN 


[Skin is warm, Skin is dry, Skin is normal color.]


PSYCHIATRIC 


[Normal affect. ]





Course





- Re-evaluation


Re-evalutation: 





21 05:45


Patient states she feels much better after the enemas.  Patient had positive 

results with 2 rounds of soapsuds enemas.  Results of ED MSE discussed with 

patient.  Emergency signs and symptoms, reasons to return to the emergency 

department discussed with patient.  Patient states "I feel a whole lot better 

than I did yesterday."





- Vital Signs


Vital signs: 


                                        











Temp Pulse Resp BP Pulse Ox


 


 98.1 F   100   18   113/73   100 


 


 21 05:01  21 05:01  21 05:01  21 05:01  21 05:01














- Laboratory Results


Result Diagrams: 


                                 21 15:18





                                 21 15:18


Laboratory Results Interpreted: 


                                        











  21





  15:18 15:18 19:00


 


RBC  3.58 L  


 


Hct  35.6 L  


 


MCV  100 H  


 


MCH  35.5 H  


 


RDW  17.1 H  


 


Potassium   3.2 L 


 


AST   46 H 


 


Alkaline Phosphatase   163 H 


 


Total Protein   8.5 H 


 


Urine Protein    30 H


 


Urine Ketones    20 H











Critical Laboratory Results Reviewed: No Critical Results


Attending or Supervising Physician who Reviewed Labs: VERONIKA TOPETE IV





- Radiology Results


Critical Radiology Results Reviewed: No Critical Results


Attending or Supervising Physician who Reviewed Radiology: VERONIKA TOPETE IV





Discharge





- Discharge


Clinical Impression: 


Constipation


Qualifiers:


 Constipation type: unspecified constipation type Qualified Code(s): K59.00 - 

Constipation, unspecified





Condition: Stable


Disposition: HOME, SELF-CARE


Instructions:  Constipation (On license of [facility])


Additional Instructions: 


 a couple of fleets enemas at [facility] and use as directed.  You can also 

 some MCT oil and use that as directed.  One other thing you can do to 

help with constipation is to buy some psyllium husk fiber capsules.  You can buy

the generic brand, you do not have to buy the name brand Metamucil, and.  I 

recommend taking 5 to 6 capsules a day.





Return to the Emergency Department without delay if any worse.











HOME CARE INSTRUCTIONS & INFORMATION:  Thank you for choosing us for your 

medical needs. We hope you're satisfied with the care you received.  After you 

leave, you must properly care for your problem and, at the same time, observe 

its progress.  Any condition can change.  Some illnesses can change rapidly over

hours or days.  If your condition worsens, return to the Emergency Department or

see your physician promptly.





ABOUT YOUR X-RAYS AND EKG'S:   If you had an EKG or X-rays taken, they have been

read by the Emergency Physician. The X-rays and EKG's will also be read by a 

Radiologist or Cardiologist within 24 hours.  If discrepancies are noted, you 

will be notified by telephone.  Please be certain the ED has a correct telephone

number & address where you can be reached.  Also, realize that some fractures or

abnormalities do not show up on initial X-rays.  If your symptoms continue, see 

your physician.





ABOUT YOUR LABORATORY TEST:   If you had laboratory tests, the results have been

reviewed by the Emergency Physician.  Some test results (for example cultures) 

may not be available for several days.  You will be contacted if any test result

shows you need additional treatment.  Please be certain the ED has a correct 

telephone number and address where you can be reached.





ABOUT YOUR MEDICATIONS:  You will receive instructions on how to take your 

medicine on the prescription label you receive.  Additional information may be 

provided by the Pharmacy.  If you have questions afterwards, call the ED for 

clarification or further instructions.  Some prescribed medications may cause 

drowsiness.  Do not perform tasks such as driving a car or operating machinery 

without consulting your Pharmacist.  If you feel you need a refill of pain 

medication, your condition will need re-evaluation.  Please do not call for a 

refill of any medication.





ABOUT YOUR SIGNATURE:   Signature of this document acknowledges to followin. Understanding that you received emergency treatment and that you may be 

   released before al medical problems are known or treated. Please be certain  

   the ED has a correct phone number & address where you can be reached.


   2. Acknowledgement that you will arrange for follow-up care as recommended.


   3. Authorization for the Emergency Physician to provide information to your 

follow-up Physician in order to maximize your care.





AT ANY TIME, IF YOUR SYMPTOMS CHANGE SIGNIFICANTLY OR WORSEN OR YOU DEVELOP NEW 

SYMPTOMS, RETURN TO THE EMERGENCY DEPARTMENT IMMEDIATELY FOR RE-EVALUATION.





OUR GOAL IS TO PROVIDE EXCELLENT MEDICAL CARE!





WE HOPE THAT WE HAVE MET YOUR EXPECTATIONS DURING YOUR EMERGENCY DEPARTMENT 

VISIT AND THAT YOU FEEL YOU HAVE RECEIVED EXCELLENT CARE!











Referrals: 


TO GARCÍA MD [Primary Care Provider] - Follow up as needed

## 2021-01-03 NOTE — RADIOLOGY REPORT (SQ)
EXAM DESCRIPTION:  ACUTE ABDOMEN SERIES



IMAGES COMPLETED DATE/TIME:  1/3/2021 3:33 pm



REASON FOR STUDY:  constipation



COMPARISON:  None.



NUMBER OF VIEWS:  Three views.



TECHNIQUE:  Frontal chest, supine abdomen and upright/decubitus abdomen radiographic images acquired.




LIMITATIONS:  None.



FINDINGS:  CHEST: Lungs clear of infiltrates.

FREE AIR: None. No abnormal gas collections.

BOWEL GAS PATTERN: Nonobstructive pattern. No dilated loops or air fluid levels.

CALCIFICATIONS: No suspicious calcifications.

HARDWARE: None in the abdomen.

SOFT TISSUES: No gross mass or suggestion of organomegaly.

BONES: No acute fracture.  No worrisome bone lesions.

OTHER: No other significant finding.



IMPRESSION:  NO RADIOGRAPHIC EVIDENCE FOR ACUTE ABDOMINAL DISEASE.



TECHNICAL DOCUMENTATION:  JOB ID:  [id number]

 2011 [employer]- All Rights Reserved



Reading location - IP/workstation name: [email or web address]

## 2021-01-03 NOTE — ER DOCUMENT REPORT
ED Medical Screen (RME)





- General


Chief Complaint: Constipation


Stated Complaint: CONSTIPATION


Time Seen by Provider: 01/03/21 15:06


Primary Care Provider: 


TO GARCÍA MD [Primary Care Provider] - Follow up as needed


Notes: 





Patient is a 40-year-old female presents emergency department with a chief 

complaint of constipation.  Patient has not had a bowel movement in 3 days.  She

normally does 3 times a day.  Patient admits to some nausea and vomiting.  Last 

time patient vomited was around 2 PM.





Exam: Tender left upper quadrant.





I have greeted and performed a rapid initial assessment of this patient.  A co

mprehensive ED assessment and evaluation of the patient, analysis of test 

results and completion of medical decision making process will be conducted by 

an additional ED providers.








TRAVEL OUTSIDE OF THE [place] IN LAST 30 DAYS: No





- Related Data


Allergies/Adverse Reactions: 


                                        





Sulfa (Sulfonamide Antibiotics) Adverse Reaction (Verified 01/03/21 15:07)


   











Past Medical History





- Past Medical History


Cardiac Medical History: Reports: Hx Atrial Fibrillation - currently wearing 

heart monitor 6/2020, Hx Congestive Heart Failure, Hx Hypercholesterolemia, Hx 

Hypertension - Untreated currently


Neurological Medical History: Reports: Hx Cerebrovascular Accident


Renal/ Medical History: Denies: Hx Peritoneal Dialysis





- Immunizations


Hx Diphtheria, Pertussis, Tetanus Vaccination: Yes





Physical Exam





- Vital signs


Vitals: 





                                        











Temp Pulse Resp BP Pulse Ox


 


 98.5 F   98   16   150/93 H  99 


 


 01/03/21 14:52  01/03/21 14:52  01/03/21 14:52  01/03/21 14:52  01/03/21 14:52














Course





- Vital Signs


Vital signs: 





                                        











Temp Pulse Resp BP Pulse Ox


 


 98.5 F   98   16   150/93 H  99 


 


 01/03/21 14:52  01/03/21 14:52  01/03/21 14:52  01/03/21 14:52  01/03/21 14:52














Doctor's Discharge





- Discharge


Referrals: 


TO GARCÍA MD [Primary Care Provider] - Follow up as needed

## 2021-01-04 VITALS — SYSTOLIC BLOOD PRESSURE: 129 MMHG | DIASTOLIC BLOOD PRESSURE: 86 MMHG
